# Patient Record
Sex: FEMALE | Race: BLACK OR AFRICAN AMERICAN | Employment: OTHER | URBAN - METROPOLITAN AREA
[De-identification: names, ages, dates, MRNs, and addresses within clinical notes are randomized per-mention and may not be internally consistent; named-entity substitution may affect disease eponyms.]

---

## 2018-03-26 ENCOUNTER — HOSPITAL ENCOUNTER (OUTPATIENT)
Dept: PREADMISSION TESTING | Age: 78
Discharge: HOME | End: 2018-03-26
Attending: ORTHOPAEDIC SURGERY
Payer: COMMERCIAL

## 2018-03-26 LAB
ALBUMIN SERPL-MCNC: 3.4 G/DL (ref 3.4–5)
ALBUMIN/GLOB SERPL: 0.9 {RATIO} (ref 0.8–1.7)
ALP SERPL-CCNC: 77 U/L (ref 45–117)
ALT SERPL-CCNC: 28 U/L (ref 13–56)
ANION GAP SERPL CALC-SCNC: 5 MMOL/L (ref 3–18)
APPEARANCE UR: CLEAR
APTT PPP: 34.1 SEC (ref 23–36.4)
AST SERPL-CCNC: 29 U/L (ref 15–37)
BACTERIA SPEC CULT: NORMAL
BACTERIA URNS QL MICRO: ABNORMAL /HPF
BASOPHILS # BLD: 0 K/UL (ref 0–0.06)
BASOPHILS NFR BLD: 0 % (ref 0–2)
BILIRUB SERPL-MCNC: 0.2 MG/DL (ref 0.2–1)
BILIRUB UR QL: NEGATIVE
BUN SERPL-MCNC: 23 MG/DL (ref 7–18)
BUN/CREAT SERPL: 33 (ref 12–20)
CALCIUM SERPL-MCNC: 9.3 MG/DL (ref 8.5–10.1)
CHLORIDE SERPL-SCNC: 107 MMOL/L (ref 100–108)
CO2 SERPL-SCNC: 30 MMOL/L (ref 21–32)
COLOR UR: YELLOW
CREAT SERPL-MCNC: 0.7 MG/DL (ref 0.6–1.3)
DIFFERENTIAL METHOD BLD: ABNORMAL
EOSINOPHIL # BLD: 0.2 K/UL (ref 0–0.4)
EOSINOPHIL NFR BLD: 2 % (ref 0–5)
EPITH CASTS URNS QL MICRO: ABNORMAL /LPF (ref 0–5)
ERYTHROCYTE [DISTWIDTH] IN BLOOD BY AUTOMATED COUNT: 15.4 % (ref 11.6–14.5)
ERYTHROCYTE [SEDIMENTATION RATE] IN BLOOD: 30 MM/HR (ref 0–30)
EST. AVERAGE GLUCOSE BLD GHB EST-MCNC: 140 MG/DL
GLOBULIN SER CALC-MCNC: 3.7 G/DL (ref 2–4)
GLUCOSE SERPL-MCNC: 97 MG/DL (ref 74–99)
GLUCOSE UR STRIP.AUTO-MCNC: NEGATIVE MG/DL
HBA1C MFR BLD: 6.5 % (ref 4.5–5.6)
HCT VFR BLD AUTO: 35.3 % (ref 35–45)
HGB BLD-MCNC: 11.3 G/DL (ref 12–16)
HGB UR QL STRIP: NEGATIVE
INR PPP: 1.9 (ref 0.8–1.2)
KETONES UR QL STRIP.AUTO: NEGATIVE MG/DL
LEUKOCYTE ESTERASE UR QL STRIP.AUTO: ABNORMAL
LYMPHOCYTES # BLD: 1.5 K/UL (ref 0.9–3.6)
LYMPHOCYTES NFR BLD: 15 % (ref 21–52)
MCH RBC QN AUTO: 26.2 PG (ref 24–34)
MCHC RBC AUTO-ENTMCNC: 32 G/DL (ref 31–37)
MCV RBC AUTO: 81.7 FL (ref 74–97)
MONOCYTES # BLD: 0.3 K/UL (ref 0.05–1.2)
MONOCYTES NFR BLD: 3 % (ref 3–10)
NEUTS SEG # BLD: 8.1 K/UL (ref 1.8–8)
NEUTS SEG NFR BLD: 80 % (ref 40–73)
NITRITE UR QL STRIP.AUTO: NEGATIVE
PH UR STRIP: 5.5 [PH] (ref 5–8)
PLATELET # BLD AUTO: 192 K/UL (ref 135–420)
PMV BLD AUTO: 10.4 FL (ref 9.2–11.8)
POTASSIUM SERPL-SCNC: 4 MMOL/L (ref 3.5–5.5)
PROT SERPL-MCNC: 7.1 G/DL (ref 6.4–8.2)
PROT UR STRIP-MCNC: NEGATIVE MG/DL
PROTHROMBIN TIME: 20.7 SEC (ref 11.5–15.2)
RBC # BLD AUTO: 4.32 M/UL (ref 4.2–5.3)
RBC #/AREA URNS HPF: NEGATIVE /HPF (ref 0–5)
SERVICE CMNT-IMP: NORMAL
SODIUM SERPL-SCNC: 142 MMOL/L (ref 136–145)
SP GR UR REFRACTOMETRY: 1.02 (ref 1–1.03)
UROBILINOGEN UR QL STRIP.AUTO: 0.2 EU/DL (ref 0.2–1)
WBC # BLD AUTO: 10.2 K/UL (ref 4.6–13.2)
WBC URNS QL MICRO: ABNORMAL /HPF (ref 0–5)

## 2018-03-26 PROCEDURE — 87641 MR-STAPH DNA AMP PROBE: CPT | Performed by: ORTHOPAEDIC SURGERY

## 2018-03-26 PROCEDURE — 36415 COLL VENOUS BLD VENIPUNCTURE: CPT | Performed by: ORTHOPAEDIC SURGERY

## 2018-03-26 PROCEDURE — 80053 COMPREHEN METABOLIC PANEL: CPT | Performed by: ORTHOPAEDIC SURGERY

## 2018-03-26 PROCEDURE — 85610 PROTHROMBIN TIME: CPT | Performed by: ORTHOPAEDIC SURGERY

## 2018-03-26 PROCEDURE — 85025 COMPLETE CBC W/AUTO DIFF WBC: CPT | Performed by: ORTHOPAEDIC SURGERY

## 2018-03-26 PROCEDURE — 83036 HEMOGLOBIN GLYCOSYLATED A1C: CPT | Performed by: ORTHOPAEDIC SURGERY

## 2018-03-26 PROCEDURE — 93005 ELECTROCARDIOGRAM TRACING: CPT

## 2018-03-26 PROCEDURE — 85652 RBC SED RATE AUTOMATED: CPT | Performed by: ORTHOPAEDIC SURGERY

## 2018-03-26 PROCEDURE — 85730 THROMBOPLASTIN TIME PARTIAL: CPT | Performed by: ORTHOPAEDIC SURGERY

## 2018-03-26 PROCEDURE — 81001 URINALYSIS AUTO W/SCOPE: CPT | Performed by: ORTHOPAEDIC SURGERY

## 2018-03-28 LAB
ATRIAL RATE: 90 BPM
CALCULATED P AXIS, ECG09: 58 DEGREES
CALCULATED R AXIS, ECG10: 13 DEGREES
CALCULATED T AXIS, ECG11: 50 DEGREES
DIAGNOSIS, 93000: NORMAL
P-R INTERVAL, ECG05: 186 MS
Q-T INTERVAL, ECG07: 378 MS
QRS DURATION, ECG06: 84 MS
QTC CALCULATION (BEZET), ECG08: 462 MS
VENTRICULAR RATE, ECG03: 90 BPM

## 2018-04-21 PROBLEM — M17.11 OSTEOARTHRITIS OF RIGHT KNEE: Chronic | Status: ACTIVE | Noted: 2018-04-21

## 2018-04-21 NOTE — DISCHARGE INSTRUCTIONS
[address] [facility] Medicine   Patient Discharge Instructions    Marco A Leyva / [id number] : 1940    Admitted (Not on file) Discharged: 2018     IF YOU HAVE ANY PROBLEMS ONCE YOU ARE  [facility] FOLLOWING NUMBERS:   Main office number: [phone]    Your follow up appointment to see either Dr. Leila Garrido PA-C, or [facility] PAAMBAR as scheduled in 2 weeks. If you are unsure of your appointment date call the office at [phone]. Medication Instructions     · Resume your home medictions as directed, you may have directed not to resume supplements until after your follow up. · A prescription for pain medication has been given   · It is important that you take the medication exactly as they are prescribed. · Keep your medication in the bottles provided by the pharmacist and keep a list of the medication names, dosages, and times to be taken in your wallet. · Do not take other medications without consulting your doctor. What to do at [address] [place] [address] Ave your prehospital diet. If you have excessive nausea or vomitting call your doctor's office. Be sure to maintain adequate fluid intake. Some pain medications may cause constipation. Remember to drink fluids, stay as active as possible, and eat plenty of fiber-rich foods. Begin In-Home Physical Therapy; 3 times a week to work on gait training, range of motion, strengthening, and weight bearing exercises as tolerable. Continue to use your walker or cane when walking. May progress from the walker to a cane to complete total bearing as tolerable. Patient may shower. Wrap incision with plastic wrap/covering to prevent incision from getting wet. Avoid complete immersion. YOUR DRESSING SHOULD BE CHANGED IN 3-5 DAYS BY [facility] NURSE.       When to Call    - Call if you have a temperature greater then 101  - Unable to keep food down  - Are unable to bear any wieght   - Need a pain medication refill     Information obtained by :  I understand that if any problems occur once I am at home I am to contact my physician. I understand and acknowledge receipt of the instructions indicated above.                                                                                                                                            Physician's or R.N.'s Signature                                                                  Date/Time                                                                                                                                              Patient or Representative Signature                                                          Date/Time

## 2018-04-21 NOTE — H&P
[address] [facility] [address] Medicine  History and Physical Exam    Patient: Tyesha Nascimento MRN: [id number]  SSN: [id number]    YOB: 1940  Age: 66 y.o. Sex: female      Subjective:      Chief Complaint: Right knee pain    History of Present Illness:  Patient complains of pain to the right knee and difficulty ambulating, which has progressively worsened over several months. X-rays showed osteoarthritis of the joint. The patient's pain has persisted and progressed despite conservative treatments and therapies. The patient has been previously treated with nsaids. The patient has at this time opted for surgical intervention. Past Medical History:   Diagnosis Date    Arthritis     all over    Hypertension many years    Ill-defined condition     benign murmur as a child    Ill-defined condition     environmental allergies, ragweed, pollen    Osteoarthritis of right knee 4/21/2018     Past Surgical History:   Procedure Laterality Date    HX APPENDECTOMY      HX HYSTERECTOMY  2015    HX KNEE REPLACEMENT Left 20 teens    HX ORTHOPAEDIC Bilateral 2000's    ctr    HX ORTHOPAEDIC  2000's    trigger thumb    HX TONSILLECTOMY      as a child     Social History     Occupational History    Not on file. Social History Main Topics    Smoking status: Never Smoker    Smokeless tobacco: Never Used    Alcohol use Yes      Comment: 1 a month    Drug use: No    Sexual activity: Not on file     Prior to Admission medications    Medication Sig Start Date End Date Taking? Authorizing Provider   amLODIPine (NORVASC) 5 mg tablet Take 5 mg by mouth daily. Historical Provider   losartan (COZAAR) 100 mg tablet Take 100 mg by mouth daily. Historical Provider   simvastatin (ZOCOR) 20 mg tablet Take 20 mg by mouth nightly. Historical Provider   montelukast (SINGULAIR) 10 mg tablet Take 10 mg by mouth nightly.     Historical Provider   cetirizine (ZYRTEC) 10 mg tablet Take 10 mg by mouth daily. Historical Provider   OTHER Indications: PRO AIR inhaler prn 8.5 g    Historical Provider   ibuprofen (ADVIL) 200 mg tablet Take 400 mg by mouth two (2) times a day. Historical Provider   omega 3-DHA-EPA-fish oil 1,000 mg (120 mg-180 mg) capsule Take 2 Caps by mouth daily. Historical Provider   cholecalciferol, VITAMIN D3, (VITAMIN D3) 5,000 unit tab tablet Take 5,000 Units by mouth daily. Historical Provider   polyvinyl alcohol-povidon,PF, (REFRESH CLASSIC) 1.4-0.6 % ophthalmic solution Administer 1-2 Drops to both eyes as needed. Historical Provider       Allergies: Allergies   Allergen Reactions    Celery Unknown (comments)     Raw Celery    Kiwi Unknown (comments)    Melon Unknown (comments)    Madison Unknown (comments)        Review of Systems:  A comprehensive review of systems was negative except for that written in the History of Present Illness. Objective:       Physical Exam:  HEENT: Normocephalic, atraumatic  Lungs:  Clear to auscultation  Heart:   Regular rate and rhythm  Abdomen: Soft  Extremities:  Pain with range of motion of the right knee(s). Active extension decreased, active flexion decreased. Tenderness generalized. No deformity. No effusion. Positive crepitus. Antalgic gait. Assessment:      Arthritis of the right knee. Plan:       Proceed with scheduled RIGHT TOTAL KNEE ARTHROPLASTY. The various methods of treatment have been discussed with the patient and family. After consideration of risks, benefits, and other options for treatment, the patient has consented to surgical interventions. Questions were answered and preoperative teaching was done by Dr Pedro Cano.      Signed By: Rica Vieyra, PA     April 21, 2018

## 2018-04-23 ENCOUNTER — ANESTHESIA (OUTPATIENT)
Dept: SURGERY | Age: 78
DRG: 470 | End: 2018-04-23
Payer: MEDICARE

## 2018-04-23 ENCOUNTER — APPOINTMENT (OUTPATIENT)
Dept: GENERAL RADIOLOGY | Age: 78
DRG: 470 | End: 2018-04-23
Attending: PHYSICIAN ASSISTANT
Payer: MEDICARE

## 2018-04-23 ENCOUNTER — ANESTHESIA EVENT (OUTPATIENT)
Dept: SURGERY | Age: 78
DRG: 470 | End: 2018-04-23
Payer: MEDICARE

## 2018-04-23 ENCOUNTER — HOSPITAL ENCOUNTER (INPATIENT)
Age: 78
LOS: 2 days | Discharge: NURSING, REHAB OR LONG-TERM CARE | DRG: 470 | End: 2018-04-25
Attending: ORTHOPAEDIC SURGERY | Admitting: ORTHOPAEDIC SURGERY
Payer: MEDICARE

## 2018-04-23 DIAGNOSIS — M17.11 PRIMARY OSTEOARTHRITIS OF RIGHT KNEE: Primary | Chronic | ICD-10-CM

## 2018-04-23 PROBLEM — M17.12 OSTEOARTHRITIS OF LEFT KNEE: Status: ACTIVE | Noted: 2018-04-23

## 2018-04-23 LAB
ABO + RH BLD: NORMAL
BLOOD GROUP ANTIBODIES SERPL: NORMAL
GLUCOSE BLD STRIP.AUTO-MCNC: 85 MG/DL (ref 70–110)
SPECIMEN EXP DATE BLD: NORMAL

## 2018-04-23 PROCEDURE — 74011250636 HC RX REV CODE- 250/636: Performed by: ORTHOPAEDIC SURGERY

## 2018-04-23 PROCEDURE — 74011250636 HC RX REV CODE- 250/636: Performed by: PHYSICIAN ASSISTANT

## 2018-04-23 PROCEDURE — 74011250637 HC RX REV CODE- 250/637: Performed by: SPECIALIST

## 2018-04-23 PROCEDURE — 86900 BLOOD TYPING SEROLOGIC ABO: CPT | Performed by: ORTHOPAEDIC SURGERY

## 2018-04-23 PROCEDURE — 74011000258 HC RX REV CODE- 258: Performed by: ORTHOPAEDIC SURGERY

## 2018-04-23 PROCEDURE — 36415 COLL VENOUS BLD VENIPUNCTURE: CPT | Performed by: ORTHOPAEDIC SURGERY

## 2018-04-23 PROCEDURE — 77030020259 HC SOL INJ SOD CL 0.9% 100ML BG: Performed by: ORTHOPAEDIC SURGERY

## 2018-04-23 PROCEDURE — 77030011628: Performed by: ORTHOPAEDIC SURGERY

## 2018-04-23 PROCEDURE — 76060000033 HC ANESTHESIA 1 TO 1.5 HR: Performed by: ORTHOPAEDIC SURGERY

## 2018-04-23 PROCEDURE — 64450 NJX AA&/STRD OTHER PN/BRANCH: CPT | Performed by: ORTHOPAEDIC SURGERY

## 2018-04-23 PROCEDURE — 76942 ECHO GUIDE FOR BIOPSY: CPT | Performed by: ORTHOPAEDIC SURGERY

## 2018-04-23 PROCEDURE — 73560 X-RAY EXAM OF KNEE 1 OR 2: CPT

## 2018-04-23 PROCEDURE — 77030003666 HC NDL SPINAL BD -A: Performed by: ORTHOPAEDIC SURGERY

## 2018-04-23 PROCEDURE — 77010033678 HC OXYGEN DAILY

## 2018-04-23 PROCEDURE — C1776 JOINT DEVICE (IMPLANTABLE): HCPCS | Performed by: ORTHOPAEDIC SURGERY

## 2018-04-23 PROCEDURE — 74011250637 HC RX REV CODE- 250/637: Performed by: PHYSICIAN ASSISTANT

## 2018-04-23 PROCEDURE — 77030012893: Performed by: ORTHOPAEDIC SURGERY

## 2018-04-23 PROCEDURE — 74011000250 HC RX REV CODE- 250: Performed by: ORTHOPAEDIC SURGERY

## 2018-04-23 PROCEDURE — 77030027138 HC INCENT SPIROMETER -A: Performed by: ORTHOPAEDIC SURGERY

## 2018-04-23 PROCEDURE — 74011250636 HC RX REV CODE- 250/636: Performed by: SPECIALIST

## 2018-04-23 PROCEDURE — 76010000149 HC OR TIME 1 TO 1.5 HR: Performed by: ORTHOPAEDIC SURGERY

## 2018-04-23 PROCEDURE — 77030037876 HC DRSG MEPILEX 16-48IN BORD MOLN -A: Performed by: ORTHOPAEDIC SURGERY

## 2018-04-23 PROCEDURE — 76210000016 HC OR PH I REC 1 TO 1.5 HR: Performed by: ORTHOPAEDIC SURGERY

## 2018-04-23 PROCEDURE — 77030038011: Performed by: ORTHOPAEDIC SURGERY

## 2018-04-23 PROCEDURE — 77030032489 HC SLV COMPR SCD FT CUF COVD -B: Performed by: ORTHOPAEDIC SURGERY

## 2018-04-23 PROCEDURE — 65270000029 HC RM PRIVATE

## 2018-04-23 PROCEDURE — 77030013708 HC HNDPC SUC IRR PULS STRY –B: Performed by: ORTHOPAEDIC SURGERY

## 2018-04-23 PROCEDURE — 97116 GAIT TRAINING THERAPY: CPT

## 2018-04-23 PROCEDURE — 74011250636 HC RX REV CODE- 250/636

## 2018-04-23 PROCEDURE — 74011000250 HC RX REV CODE- 250

## 2018-04-23 PROCEDURE — C9290 INJ, BUPIVACAINE LIPOSOME: HCPCS | Performed by: ORTHOPAEDIC SURGERY

## 2018-04-23 PROCEDURE — 0SRC0J9 REPLACEMENT OF RIGHT KNEE JOINT WITH SYNTHETIC SUBSTITUTE, CEMENTED, OPEN APPROACH: ICD-10-PCS | Performed by: ORTHOPAEDIC SURGERY

## 2018-04-23 PROCEDURE — C1713 ANCHOR/SCREW BN/BN,TIS/BN: HCPCS | Performed by: ORTHOPAEDIC SURGERY

## 2018-04-23 PROCEDURE — 77030031139 HC SUT VCRL2 J&J -A: Performed by: ORTHOPAEDIC SURGERY

## 2018-04-23 PROCEDURE — 82962 GLUCOSE BLOOD TEST: CPT

## 2018-04-23 PROCEDURE — 77030018836 HC SOL IRR NACL ICUM -A: Performed by: ORTHOPAEDIC SURGERY

## 2018-04-23 PROCEDURE — 77030002934 HC SUT MCRYL J&J -B: Performed by: ORTHOPAEDIC SURGERY

## 2018-04-23 PROCEDURE — 77030020782 HC GWN BAIR PAWS FLX 3M -B: Performed by: ORTHOPAEDIC SURGERY

## 2018-04-23 PROCEDURE — 77030038010: Performed by: ORTHOPAEDIC SURGERY

## 2018-04-23 PROCEDURE — 77030011640 HC PAD GRND REM COVD -A: Performed by: ORTHOPAEDIC SURGERY

## 2018-04-23 PROCEDURE — 74011250637 HC RX REV CODE- 250/637: Performed by: ORTHOPAEDIC SURGERY

## 2018-04-23 PROCEDURE — 97161 PT EVAL LOW COMPLEX 20 MIN: CPT

## 2018-04-23 PROCEDURE — 77030012508 HC MSK AIRWY LMA AMBU -A: Performed by: SPECIALIST

## 2018-04-23 PROCEDURE — 77030020813 HC INST SCULP CEM KT DISP S&N -B: Performed by: ORTHOPAEDIC SURGERY

## 2018-04-23 PROCEDURE — 77030034694 HC SCPL CANADY PLSM DISP USMD -E: Performed by: ORTHOPAEDIC SURGERY

## 2018-04-23 PROCEDURE — 77030037400 HC ADH TISS HI VISC EXOFIN CHMP -B: Performed by: ORTHOPAEDIC SURGERY

## 2018-04-23 DEVICE — COMPONENT PAT DIA35MM KNEE POLY DOME CEM MEDIALIZED ATTUNE: Type: IMPLANTABLE DEVICE | Site: KNEE | Status: FUNCTIONAL

## 2018-04-23 DEVICE — CEMENT BNE 20GM HALF DOSE PMMA VISC RADPQ FAST: Type: IMPLANTABLE DEVICE | Site: KNEE | Status: FUNCTIONAL

## 2018-04-23 DEVICE — COMPONENT FEM SZ 5 L KNEE NAR POST STBL CEM ATTUNE: Type: IMPLANTABLE DEVICE | Site: KNEE | Status: FUNCTIONAL

## 2018-04-23 DEVICE — INSERT TIB RP FEM KNEE CEM: Type: IMPLANTABLE DEVICE | Status: FUNCTIONAL

## 2018-04-23 RX ORDER — SODIUM CHLORIDE 0.9 % (FLUSH) 0.9 %
5-10 SYRINGE (ML) INJECTION AS NEEDED
Status: DISCONTINUED | OUTPATIENT
Start: 2018-04-23 | End: 2018-04-23 | Stop reason: HOSPADM

## 2018-04-23 RX ORDER — METOCLOPRAMIDE HYDROCHLORIDE 5 MG/ML
10 INJECTION INTRAMUSCULAR; INTRAVENOUS
Status: DISCONTINUED | OUTPATIENT
Start: 2018-04-23 | End: 2018-04-25 | Stop reason: HOSPADM

## 2018-04-23 RX ORDER — MELATONIN
5000 DAILY
Status: DISCONTINUED | OUTPATIENT
Start: 2018-04-24 | End: 2018-04-25 | Stop reason: HOSPADM

## 2018-04-23 RX ORDER — NALOXONE HYDROCHLORIDE 0.4 MG/ML
0.4 INJECTION, SOLUTION INTRAMUSCULAR; INTRAVENOUS; SUBCUTANEOUS AS NEEDED
Status: DISCONTINUED | OUTPATIENT
Start: 2018-04-23 | End: 2018-04-25 | Stop reason: HOSPADM

## 2018-04-23 RX ORDER — ZOLPIDEM TARTRATE 5 MG/1
5-10 TABLET ORAL
Status: DISCONTINUED | OUTPATIENT
Start: 2018-04-23 | End: 2018-04-25 | Stop reason: HOSPADM

## 2018-04-23 RX ORDER — ACETAMINOPHEN 325 MG/1
650 TABLET ORAL EVERY 6 HOURS
Status: DISCONTINUED | OUTPATIENT
Start: 2018-04-23 | End: 2018-04-25 | Stop reason: HOSPADM

## 2018-04-23 RX ORDER — ONDANSETRON 2 MG/ML
4 INJECTION INTRAMUSCULAR; INTRAVENOUS
Status: DISCONTINUED | OUTPATIENT
Start: 2018-04-23 | End: 2018-04-25 | Stop reason: HOSPADM

## 2018-04-23 RX ORDER — ACETAMINOPHEN 10 MG/ML
1000 INJECTION, SOLUTION INTRAVENOUS ONCE
Status: COMPLETED | OUTPATIENT
Start: 2018-04-23 | End: 2018-04-23

## 2018-04-23 RX ORDER — SODIUM CHLORIDE 0.9 % (FLUSH) 0.9 %
5-10 SYRINGE (ML) INJECTION EVERY 8 HOURS
Status: DISCONTINUED | OUTPATIENT
Start: 2018-04-23 | End: 2018-04-25 | Stop reason: HOSPADM

## 2018-04-23 RX ORDER — ONDANSETRON 2 MG/ML
INJECTION INTRAMUSCULAR; INTRAVENOUS AS NEEDED
Status: DISCONTINUED | OUTPATIENT
Start: 2018-04-23 | End: 2018-04-23 | Stop reason: HOSPADM

## 2018-04-23 RX ORDER — SODIUM CHLORIDE, SODIUM LACTATE, POTASSIUM CHLORIDE, CALCIUM CHLORIDE 600; 310; 30; 20 MG/100ML; MG/100ML; MG/100ML; MG/100ML
125 INJECTION, SOLUTION INTRAVENOUS CONTINUOUS
Status: DISCONTINUED | OUTPATIENT
Start: 2018-04-23 | End: 2018-04-25 | Stop reason: HOSPADM

## 2018-04-23 RX ORDER — SODIUM CHLORIDE 9 MG/ML
300 INJECTION, SOLUTION INTRAVENOUS CONTINUOUS
Status: DISPENSED | OUTPATIENT
Start: 2018-04-23 | End: 2018-04-23

## 2018-04-23 RX ORDER — FENTANYL CITRATE 50 UG/ML
25 INJECTION, SOLUTION INTRAMUSCULAR; INTRAVENOUS
Status: DISCONTINUED | OUTPATIENT
Start: 2018-04-23 | End: 2018-04-23 | Stop reason: HOSPADM

## 2018-04-23 RX ORDER — DEXTROSE 50 % IN WATER (D50W) INTRAVENOUS SYRINGE
25-50 AS NEEDED
Status: DISCONTINUED | OUTPATIENT
Start: 2018-04-23 | End: 2018-04-23 | Stop reason: HOSPADM

## 2018-04-23 RX ORDER — INSULIN LISPRO 100 [IU]/ML
INJECTION, SOLUTION INTRAVENOUS; SUBCUTANEOUS ONCE
Status: DISCONTINUED | OUTPATIENT
Start: 2018-04-23 | End: 2018-04-23 | Stop reason: HOSPADM

## 2018-04-23 RX ORDER — ACETAMINOPHEN 325 MG/1
TABLET ORAL
COMMUNITY
End: 2018-04-25

## 2018-04-23 RX ORDER — OXYCODONE AND ACETAMINOPHEN 5; 325 MG/1; MG/1
TABLET ORAL
Qty: 60 TAB | Refills: 0 | Status: ON HOLD | OUTPATIENT
Start: 2018-04-23 | End: 2021-02-03

## 2018-04-23 RX ORDER — PREGABALIN 50 MG/1
50 CAPSULE ORAL
Status: COMPLETED | OUTPATIENT
Start: 2018-04-23 | End: 2018-04-23

## 2018-04-23 RX ORDER — PANTOPRAZOLE SODIUM 40 MG/1
40 TABLET, DELAYED RELEASE ORAL DAILY
Status: DISCONTINUED | OUTPATIENT
Start: 2018-04-23 | End: 2018-04-25 | Stop reason: HOSPADM

## 2018-04-23 RX ORDER — DIPHENHYDRAMINE HCL 25 MG
25 CAPSULE ORAL
Status: DISCONTINUED | OUTPATIENT
Start: 2018-04-23 | End: 2018-04-25 | Stop reason: HOSPADM

## 2018-04-23 RX ORDER — DEXAMETHASONE SODIUM PHOSPHATE 4 MG/ML
4 INJECTION, SOLUTION INTRA-ARTICULAR; INTRALESIONAL; INTRAMUSCULAR; INTRAVENOUS; SOFT TISSUE ONCE
Status: COMPLETED | OUTPATIENT
Start: 2018-04-23 | End: 2018-04-23

## 2018-04-23 RX ORDER — MONTELUKAST SODIUM 10 MG/1
10 TABLET ORAL
Status: DISCONTINUED | OUTPATIENT
Start: 2018-04-23 | End: 2018-04-25 | Stop reason: HOSPADM

## 2018-04-23 RX ORDER — KETOROLAC TROMETHAMINE 15 MG/ML
15 INJECTION, SOLUTION INTRAMUSCULAR; INTRAVENOUS EVERY 6 HOURS
Status: COMPLETED | OUTPATIENT
Start: 2018-04-23 | End: 2018-04-24

## 2018-04-23 RX ORDER — LANOLIN ALCOHOL/MO/W.PET/CERES
1 CREAM (GRAM) TOPICAL 3 TIMES DAILY
Status: DISCONTINUED | OUTPATIENT
Start: 2018-04-23 | End: 2018-04-25 | Stop reason: HOSPADM

## 2018-04-23 RX ORDER — LORATADINE 10 MG/1
10 TABLET ORAL DAILY
Status: DISCONTINUED | OUTPATIENT
Start: 2018-04-24 | End: 2018-04-25 | Stop reason: HOSPADM

## 2018-04-23 RX ORDER — TRANEXAMIC ACID 650 1/1
1950 TABLET ORAL ONCE
Status: COMPLETED | OUTPATIENT
Start: 2018-04-23 | End: 2018-04-23

## 2018-04-23 RX ORDER — MIDAZOLAM HYDROCHLORIDE 1 MG/ML
INJECTION, SOLUTION INTRAMUSCULAR; INTRAVENOUS AS NEEDED
Status: DISCONTINUED | OUTPATIENT
Start: 2018-04-23 | End: 2018-04-23 | Stop reason: HOSPADM

## 2018-04-23 RX ORDER — MAGNESIUM SULFATE 100 %
4 CRYSTALS MISCELLANEOUS AS NEEDED
Status: DISCONTINUED | OUTPATIENT
Start: 2018-04-23 | End: 2018-04-23 | Stop reason: HOSPADM

## 2018-04-23 RX ORDER — NALOXONE HYDROCHLORIDE 0.4 MG/ML
0.2 INJECTION, SOLUTION INTRAMUSCULAR; INTRAVENOUS; SUBCUTANEOUS AS NEEDED
Status: DISCONTINUED | OUTPATIENT
Start: 2018-04-23 | End: 2018-04-23 | Stop reason: HOSPADM

## 2018-04-23 RX ORDER — PROPOFOL 10 MG/ML
INJECTION, EMULSION INTRAVENOUS AS NEEDED
Status: DISCONTINUED | OUTPATIENT
Start: 2018-04-23 | End: 2018-04-23 | Stop reason: HOSPADM

## 2018-04-23 RX ORDER — PANTOPRAZOLE SODIUM 40 MG/1
40 TABLET, DELAYED RELEASE ORAL DAILY
Status: DISCONTINUED | OUTPATIENT
Start: 2018-04-23 | End: 2018-04-23

## 2018-04-23 RX ORDER — CEFAZOLIN SODIUM/WATER 2 G/20 ML
2 SYRINGE (ML) INTRAVENOUS EVERY 8 HOURS
Status: COMPLETED | OUTPATIENT
Start: 2018-04-23 | End: 2018-04-24

## 2018-04-23 RX ORDER — SODIUM CHLORIDE, SODIUM LACTATE, POTASSIUM CHLORIDE, CALCIUM CHLORIDE 600; 310; 30; 20 MG/100ML; MG/100ML; MG/100ML; MG/100ML
100 INJECTION, SOLUTION INTRAVENOUS CONTINUOUS
Status: DISCONTINUED | OUTPATIENT
Start: 2018-04-23 | End: 2018-04-23 | Stop reason: HOSPADM

## 2018-04-23 RX ORDER — SODIUM CHLORIDE 9 MG/ML
125 INJECTION, SOLUTION INTRAVENOUS CONTINUOUS
Status: DISPENSED | OUTPATIENT
Start: 2018-04-23 | End: 2018-04-24

## 2018-04-23 RX ORDER — LIDOCAINE HYDROCHLORIDE 20 MG/ML
INJECTION, SOLUTION EPIDURAL; INFILTRATION; INTRACAUDAL; PERINEURAL AS NEEDED
Status: DISCONTINUED | OUTPATIENT
Start: 2018-04-23 | End: 2018-04-23 | Stop reason: HOSPADM

## 2018-04-23 RX ORDER — EPHEDRINE SULFATE/0.9% NACL/PF 25 MG/5 ML
SYRINGE (ML) INTRAVENOUS AS NEEDED
Status: DISCONTINUED | OUTPATIENT
Start: 2018-04-23 | End: 2018-04-23 | Stop reason: HOSPADM

## 2018-04-23 RX ORDER — ASPIRIN 325 MG/1
325 TABLET, FILM COATED ORAL
Status: DISCONTINUED | OUTPATIENT
Start: 2018-04-23 | End: 2018-04-25 | Stop reason: HOSPADM

## 2018-04-23 RX ORDER — MELOXICAM 7.5 MG/1
7.5 TABLET ORAL 2 TIMES DAILY
Status: DISCONTINUED | OUTPATIENT
Start: 2018-04-23 | End: 2018-04-25 | Stop reason: HOSPADM

## 2018-04-23 RX ORDER — ACETAMINOPHEN 10 MG/ML
1000 INJECTION, SOLUTION INTRAVENOUS EVERY 6 HOURS
Status: DISCONTINUED | OUTPATIENT
Start: 2018-04-23 | End: 2018-04-23 | Stop reason: ALTCHOICE

## 2018-04-23 RX ORDER — LOSARTAN POTASSIUM 50 MG/1
100 TABLET ORAL DAILY
Status: DISCONTINUED | OUTPATIENT
Start: 2018-04-24 | End: 2018-04-25 | Stop reason: HOSPADM

## 2018-04-23 RX ORDER — DIPHENHYDRAMINE HYDROCHLORIDE 50 MG/ML
12.5 INJECTION, SOLUTION INTRAMUSCULAR; INTRAVENOUS
Status: DISCONTINUED | OUTPATIENT
Start: 2018-04-23 | End: 2018-04-25 | Stop reason: HOSPADM

## 2018-04-23 RX ORDER — ASPIRIN 325 MG
325 TABLET ORAL 2 TIMES DAILY
Qty: 42 TAB | Refills: 0 | Status: SHIPPED | OUTPATIENT
Start: 2018-04-23 | End: 2018-05-14

## 2018-04-23 RX ORDER — SIMVASTATIN 20 MG/1
20 TABLET, FILM COATED ORAL
Status: DISCONTINUED | OUTPATIENT
Start: 2018-04-23 | End: 2018-04-25 | Stop reason: HOSPADM

## 2018-04-23 RX ORDER — CELECOXIB 100 MG/1
400 CAPSULE ORAL
Status: COMPLETED | OUTPATIENT
Start: 2018-04-23 | End: 2018-04-23

## 2018-04-23 RX ORDER — OXYCODONE HYDROCHLORIDE 5 MG/1
5-10 TABLET ORAL
Status: DISCONTINUED | OUTPATIENT
Start: 2018-04-23 | End: 2018-04-25 | Stop reason: HOSPADM

## 2018-04-23 RX ORDER — ALBUTEROL SULFATE 90 UG/1
1 AEROSOL, METERED RESPIRATORY (INHALATION)
Status: DISCONTINUED | OUTPATIENT
Start: 2018-04-23 | End: 2018-04-25 | Stop reason: HOSPADM

## 2018-04-23 RX ORDER — AMLODIPINE BESYLATE 5 MG/1
5 TABLET ORAL DAILY
Status: DISCONTINUED | OUTPATIENT
Start: 2018-04-24 | End: 2018-04-25 | Stop reason: HOSPADM

## 2018-04-23 RX ORDER — DOCUSATE SODIUM 100 MG/1
100 CAPSULE, LIQUID FILLED ORAL 2 TIMES DAILY
Status: DISCONTINUED | OUTPATIENT
Start: 2018-04-23 | End: 2018-04-25 | Stop reason: HOSPADM

## 2018-04-23 RX ORDER — MELOXICAM 7.5 MG/1
7.5 TABLET ORAL 2 TIMES DAILY
Qty: 28 TAB | Refills: 0 | Status: SHIPPED | OUTPATIENT
Start: 2018-04-23 | End: 2018-05-07

## 2018-04-23 RX ORDER — CEFAZOLIN SODIUM/WATER 2 G/20 ML
2 SYRINGE (ML) INTRAVENOUS ONCE
Status: COMPLETED | OUTPATIENT
Start: 2018-04-23 | End: 2018-04-23

## 2018-04-23 RX ORDER — SODIUM CHLORIDE 0.9 % (FLUSH) 0.9 %
5-10 SYRINGE (ML) INJECTION AS NEEDED
Status: DISCONTINUED | OUTPATIENT
Start: 2018-04-23 | End: 2018-04-25 | Stop reason: HOSPADM

## 2018-04-23 RX ADMIN — FERROUS SULFATE TAB 325 MG (65 MG ELEMENTAL FE) 325 MG: 325 (65 FE) TAB at 21:33

## 2018-04-23 RX ADMIN — ACETAMINOPHEN 650 MG: 325 TABLET ORAL at 23:36

## 2018-04-23 RX ADMIN — Medication 5 MG: at 13:10

## 2018-04-23 RX ADMIN — SIMVASTATIN 20 MG: 20 TABLET, FILM COATED ORAL at 21:33

## 2018-04-23 RX ADMIN — SODIUM CHLORIDE 300 ML/HR: 900 INJECTION, SOLUTION INTRAVENOUS at 15:30

## 2018-04-23 RX ADMIN — ACETAMINOPHEN 1000 MG: 10 INJECTION, SOLUTION INTRAVENOUS at 12:31

## 2018-04-23 RX ADMIN — SODIUM CHLORIDE, SODIUM LACTATE, POTASSIUM CHLORIDE, AND CALCIUM CHLORIDE 125 ML/HR: 600; 310; 30; 20 INJECTION, SOLUTION INTRAVENOUS at 11:03

## 2018-04-23 RX ADMIN — Medication 2 G: at 19:11

## 2018-04-23 RX ADMIN — ONDANSETRON 4 MG: 2 INJECTION INTRAMUSCULAR; INTRAVENOUS at 12:51

## 2018-04-23 RX ADMIN — DEXAMETHASONE SODIUM PHOSPHATE 4 MG: 4 INJECTION, SOLUTION INTRAMUSCULAR; INTRAVENOUS at 11:42

## 2018-04-23 RX ADMIN — PREGABALIN 50 MG: 50 CAPSULE ORAL at 11:42

## 2018-04-23 RX ADMIN — OXYCODONE HYDROCHLORIDE 10 MG: 5 TABLET ORAL at 14:29

## 2018-04-23 RX ADMIN — ASPIRIN 325 MG: 325 TABLET, FILM COATED ORAL at 17:42

## 2018-04-23 RX ADMIN — LIDOCAINE HYDROCHLORIDE 50 MG: 20 INJECTION, SOLUTION EPIDURAL; INFILTRATION; INTRACAUDAL; PERINEURAL at 12:24

## 2018-04-23 RX ADMIN — PROPOFOL 30 MG: 10 INJECTION, EMULSION INTRAVENOUS at 12:45

## 2018-04-23 RX ADMIN — PANTOPRAZOLE SODIUM 40 MG: 40 TABLET, DELAYED RELEASE ORAL at 11:41

## 2018-04-23 RX ADMIN — OXYCODONE HYDROCHLORIDE 10 MG: 5 TABLET ORAL at 23:36

## 2018-04-23 RX ADMIN — OXYCODONE HYDROCHLORIDE 10 MG: 5 TABLET ORAL at 19:11

## 2018-04-23 RX ADMIN — Medication 10 MG: at 13:14

## 2018-04-23 RX ADMIN — MELOXICAM 7.5 MG: 7.5 TABLET ORAL at 17:42

## 2018-04-23 RX ADMIN — FENTANYL CITRATE 25 MCG: 50 INJECTION, SOLUTION INTRAMUSCULAR; INTRAVENOUS at 14:02

## 2018-04-23 RX ADMIN — KETOROLAC TROMETHAMINE 15 MG: 15 INJECTION, SOLUTION INTRAMUSCULAR; INTRAVENOUS at 17:42

## 2018-04-23 RX ADMIN — FERROUS SULFATE TAB 325 MG (65 MG ELEMENTAL FE) 325 MG: 325 (65 FE) TAB at 17:42

## 2018-04-23 RX ADMIN — TRANEXAMIC ACID 1950 MG: 650 TABLET ORAL at 11:03

## 2018-04-23 RX ADMIN — SODIUM CHLORIDE, SODIUM LACTATE, POTASSIUM CHLORIDE, AND CALCIUM CHLORIDE 1000 ML: 600; 310; 30; 20 INJECTION, SOLUTION INTRAVENOUS at 11:03

## 2018-04-23 RX ADMIN — SODIUM CHLORIDE 125 ML/HR: 900 INJECTION, SOLUTION INTRAVENOUS at 21:05

## 2018-04-23 RX ADMIN — DOCUSATE SODIUM 100 MG: 100 CAPSULE, LIQUID FILLED ORAL at 17:42

## 2018-04-23 RX ADMIN — PROPOFOL 60 MG: 10 INJECTION, EMULSION INTRAVENOUS at 12:25

## 2018-04-23 RX ADMIN — Medication 2 G: at 12:23

## 2018-04-23 RX ADMIN — FENTANYL CITRATE 25 MCG: 50 INJECTION, SOLUTION INTRAMUSCULAR; INTRAVENOUS at 14:07

## 2018-04-23 RX ADMIN — CELECOXIB 400 MG: 100 CAPSULE ORAL at 11:41

## 2018-04-23 RX ADMIN — PROPOFOL 110 MG: 10 INJECTION, EMULSION INTRAVENOUS at 12:24

## 2018-04-23 RX ADMIN — MIDAZOLAM HYDROCHLORIDE 4 MG: 1 INJECTION, SOLUTION INTRAMUSCULAR; INTRAVENOUS at 11:46

## 2018-04-23 RX ADMIN — ACETAMINOPHEN 650 MG: 325 TABLET ORAL at 17:42

## 2018-04-23 RX ADMIN — MONTELUKAST SODIUM 10 MG: 10 TABLET, FILM COATED ORAL at 21:33

## 2018-04-23 RX ADMIN — Medication 10 MG: at 13:30

## 2018-04-23 RX ADMIN — SODIUM CHLORIDE 125 ML/HR: 900 INJECTION, SOLUTION INTRAVENOUS at 19:12

## 2018-04-23 RX ADMIN — SODIUM CHLORIDE, SODIUM LACTATE, POTASSIUM CHLORIDE, AND CALCIUM CHLORIDE: 600; 310; 30; 20 INJECTION, SOLUTION INTRAVENOUS at 13:27

## 2018-04-23 RX ADMIN — KETOROLAC TROMETHAMINE 15 MG: 15 INJECTION, SOLUTION INTRAMUSCULAR; INTRAVENOUS at 23:37

## 2018-04-23 NOTE — ANESTHESIA POSTPROCEDURE EVALUATION
.  Post-Anesthesia Evaluation & Assessment    Visit Vitals    /62    Pulse 91    Temp 36.6 °C (97.9 °F)    Resp 18    Ht 5' 5\" (1.651 m)    Wt 90.4 kg (199 lb 5 oz)    SpO2 100%    BMI 33.17 kg/m2       Nausea/Vomiting: no nausea    Post-operative hydration adequate.     Pain score (VAS): 0    Mental status & Level of consciousness: alert and oriented x 3    Neurological status: moves all extremities, sensation grossly intact    Pulmonary status: airway patent, no supplemental oxygen required    Complications related to anesthesia: none    Additional comments:

## 2018-04-23 NOTE — PROGRESS NOTES
Problem: Mobility Impaired (Adult and Pediatric)  Goal: *Acute Goals and Plan of Care (Insert Text)  In 1-7 days pt will be able to perform:  ST.  Bed mobility:  Rolling L to R to L modified independent for positioning. 2.  Supine to sit to supine S with HR for meals. 3.  Sit to stand to sit S with RW in prep for ambulation. LT.  Gait:  Ambulate >150ft S with RW, WBAT, for home/community mobility. 2.  Activity tolerance: Tolerate up in chair 1-2 hours for ADLs. 3.  Patient/Family Education:  Patient/family to be independent with HEP for follow-up care and safe discharge. physical Therapy EVALUATION    Patient: Frantz Ashraf (32 y.o. female)  Date: 2018  Primary Diagnosis: RIGHT KNEE OSTEOARTHRITIS  Osteoarthritis of left knee  Procedure(s) (LRB):  RIGHT TOTAL KNEE REPLACEMENT (Right) Day of Surgery   Precautions:   Fall, WBAT    ASSESSMENT :  Based on the objective data described below, the patient presents with decreased functional mobility and independence in regard to bed mobility, transfers, gt quality and tolerance, R knee AROM, R knee strength, pain, stair negotiation and safety due to recent R TKA surgery. Pt rating pain in R knee 7/10 on numerical pain scale. Pt required CGA/min A for supine to sit to stand. Pt able to participate in gt training w/ RW, WBAT, GB and CGA/min A in hallway w/ antalgic pattern. Pt able to void on commode and perform own hygiene. Pt returned to sitting EOB w/ all needs within reach. Nurse Sheeba  aware and family present. Recommend [facility] upon hospital d/c. Patient will benefit from skilled intervention to address the above impairments.   Patients rehabilitation potential is considered to be Good  Factors which may influence rehabilitation potential include:   []         None noted  []         Mental ability/status  []         Medical condition  []         Home/family situation and support systems  []         Safety awareness  [x]         Pain tolerance/management  []         Other:      PLAN :  Recommendations and Planned Interventions:  [x]           Bed Mobility Training             []    Neuromuscular Re-Education  [x]           Transfer Training                   []    Orthotic/Prosthetic Training  [x]           Gait Training                          []    Modalities  [x]           Therapeutic Exercises          []    Edema Management/Control  [x]           Therapeutic Activities            [x]    Patient and Family Training/Education  []           Other (comment):    Frequency/Duration: Patient will be followed by physical therapy twice daily to address goals. Discharge Recommendations: Home Health  Further Equipment Recommendations for Discharge: N/A     SUBJECTIVE:   Patient stated I feel like I did before the surgery.     OBJECTIVE DATA SUMMARY:     Past Medical History:   Diagnosis Date    Arthritis     all over    Hypertension many years    Ill-defined condition     benign murmur as a child    Ill-defined condition     environmental allergies, ragweed, pollen    Osteoarthritis of right knee 4/21/2018     Past Surgical History:   Procedure Laterality Date    HX APPENDECTOMY      HX HYSTERECTOMY  2015    HX KNEE REPLACEMENT Left 20 teens    HX ORTHOPAEDIC Bilateral 2000's    ctr    HX ORTHOPAEDIC  2000's    trigger thumb    HX TONSILLECTOMY      as a child     Barriers to Learning/Limitations: None  Compensate with: visual, verbal, tactile, kinesthetic cues/model  Prior Level of Function/Home Situation:   Home Situation  Home Environment: Private residence  # Steps to Enter: 2  Rails to Enter: No  One/Two Story Residence: One story  Living Alone: Yes  Support Systems: Family member(s)  Patient Expects to be Discharged to[de-identified] Rehabilitation facility  Current DME Used/Available at Home: Cane, straight, Raised toilet seat, Walker, rolling  Critical Behavior:  Neurologic State: Alert; Appropriate for age  Orientation Level: Oriented X4  Cognition: Appropriate decision making; Appropriate for age attention/concentration; Appropriate safety awareness; Follows commands  Safety/Judgement: Awareness of environment  Psychosocial  Patient Behaviors: Calm; Cooperative  Purposeful Interaction: Yes  Caritas Process: Establish trust;Nurture loving kindness  Skin Condition/Temp: Dry;Warm  Skin Integrity: Incision (comment) (L knee)  Skin Integumentary  Skin Color: Appropriate for ethnicity  Skin Condition/Temp: Dry;Warm  Skin Integrity: Incision (comment) (L knee)  Turgor: Non-tenting  Hair Growth: Present  Varicosities: Absent  Strength:    Strength: Generally decreased, functional  Tone & Sensation:   Tone: Normal  Sensation: Intact  Range Of Motion:  AROM: Generally decreased, functional  Functional Mobility:  Bed Mobility:  Supine to Sit: Contact guard assistance (vc)  Scooting: Contact guard assistance (vc)  Transfers:  Sit to Stand: Minimum assistance; Additional time (vc)  Stand to Sit: Contact guard assistance (vc)  Balance:   Sitting: Intact  Standing: Intact; With support  Ambulation/Gait Training:  Distance (ft): 40 Feet (ft)  Assistive Device: Walker, rolling;Gait belt  Ambulation - Level of Assistance: Contact guard assistance;Minimal assistance (vc)  Gait Abnormalities: Antalgic;Decreased step clearance; Step to gait  Right Side Weight Bearing: As tolerated  Base of Support: Shift to left  Stance: Right decreased  Speed/Louise: Slow  Step Length: Left shortened;Right shortened  Swing Pattern: Left asymmetrical;Right asymmetrical  Interventions: Safety awareness training; Tactile cues; Verbal cues  Therapeutic Exercises:   HEP written copy issued to pt per MD protocol.   Pain:  Pain Scale 1: Numeric (0 - 10)  Pain Intensity 1: 7  Pain Location 1: Knee  Pain Orientation 1: Right  Pain Description 1: Pressure  Pain Intervention(s) 1: Medication (see MAR)  Activity Tolerance:   Fair   Please refer to the flowsheet for vital signs taken during this treatment. After treatment:   [x]         Patient left in no apparent distress sitting up EOB  []         Patient left in no apparent distress in bed  [x]         Call bell left within reach  [x]         Nursing notified  [x]         Family present  []         Bed alarm activated    COMMUNICATION/EDUCATION:   [x]         Fall prevention education was provided and the patient/caregiver indicated understanding. [x]         Patient/family have participated as able in goal setting and plan of care. [x]         Patient/family agree to work toward stated goals and plan of care. []         Patient understands intent and goals of therapy, but is neutral about his/her participation. []         Patient is unable to participate in goal setting and plan of care. Thank you for this referral.  Emmanuel Son, PT   Time Calculation: 28 mins    Eval Complexity: History: MEDIUM  Complexity : 1-2 comorbidities / personal factors will impact the outcome/ POC Exam:MEDIUM Complexity : 3 Standardized tests and measures addressing body structure, function, activity limitation and / or participation in recreation  Presentation: MEDIUM Complexity : Evolving with changing characteristics  Clinical Decision Making:Low Complexity amb <30' Overall Complexity:LOW      Mobility  Current  CJ= 20-39%   Goal  CI= 1-19%. The severity rating is based on the Level of Assistance required for Functional Mobility and ADLs.

## 2018-04-23 NOTE — PERIOP NOTES
Dual skin assessment completed, WNL other the healed scar mid upper back Blood pressure 125/74, pulse 74, temperature 97.2 °F (36.2 °C), resp. rate 12, height 5' 5\" (1.651 m), weight 90.4 kg (199 lb 5 oz), SpO2 100 %.   Patient accepted by Nnamdi Howell

## 2018-04-23 NOTE — INTERVAL H&P NOTE
H&P Update:  JEREMIAH Peck was seen and examined. History and physical has been reviewed. The patient has been examined.  There have been no significant clinical changes since the completion of the originally dated History and Physical.    Signed By: Jammie Emmanuel MD     April 23, 2018 10:09 AM

## 2018-04-23 NOTE — OP NOTES
[address] [address] Medicine  Total Knee Arthroplasty    Patient: Krystle Chan MRN: [id number]  SSN: [id number]    YOB: 1940  Age: 66 y.o. Sex: female      Date of Surgery: 4/23/2018   Preoperative Diagnosis: RIGHT KNEE OSTEOARTHRITIS   Postoperative Diagnosis: RIGHT KNEE OSTEOARTHRITIS   Location: [facility]  Surgeon: Haroldo Saini MD  Assistant: [facility], VALERIO    Anesthesia: General and Femoral Nerve Block    Procedure: Total Knee Arthroplasty: Depuy   The complexity of the total joint surgery requires the use of a first assistant for positioning, retraction and assistance in closure. Tourniquet Time: Tourniquet not used. Estimated Blood Loss: Less than 100cc     Implants:   Implant Name Type Inv. Item Serial No.  Lot No. LRB No. Used Action   CEMENT BNE FAST SET 20GM --  - [id number]  CEMENT BNE FAST SET 20GM --   [employer] [id number] Right 1 Implanted   FEM PS MORRO LUIS MANUEL LT SZ 5 -- ATTUNE - [id number]  FEM PS MORRO LUIS MANUEL LT SZ 5 -- ATTUNE  J [employer] [id number] Right 1 Implanted   ATTUNE TIBIAL INSERT    [employer] [id number] Right 1 Implanted   ATTUNE TIBIAL BASE    [employer] [id number] Right 1 Implanted   PAT LUIS MANUEL DOME MEDIAL 35MM -- ATTUNE - [id number]   PAT LUIS MANUEL DOME MEDIAL 35MM -- ATTUNE   [employer] [id number] Right 1 Implanted        Specimens: None    Additional Findings: None     Body Mass Index: Body mass index is 33.17 kg/(m^2). Procedure Detail:  Prior to the surgery the patient was administered a femoral nerve block in the preoperative holding area by the anesthesiologist. Krystle Chan was brought to the operating room and positioned on the operating table. She was anesthetized with anesthesia. Intravenous antibiotics were administered. Prior to the incision being made a timeout was called identifying the patient, procedure, operative side, and surgeon.  A pneumatic tourniquet was placed about the limb and the right leg was prepped and draped in the usual sterile manner. The tourniquet was not inflated throughout the case. A midline anterior incision made over the knee. The incision was carried down through the subcutaneous tissue to the underlying capsule. A medial parapatellar capsular incision was performed. The medial capsular flap was carefully elevated around to the posterior medial corner protecting the medial collateral ligaments and the fibers. The patella was sized with a caliper, and approximately 10-12 mm was resected with an oscillating saw allowing the patella to be slid into the lateral gutter. It was not everted throughout the case. Our attention was first turned to the distal femur and using intermedullary instrumentation, a 5-degree valgus cut was on the distal end of the femur. The distal end of the femur was sized to a size 5N femoral component. Pins were inserted through the sizer and the corresponding 4-in-1 block was slid into place and pinned for stability. Anterior and posterior and chamfer cuts were made to accommodate the femoral component. The medial and lateral menisci were excised as were the anterior cruciate ligaments. Our attention was then turned to the tibia. Using extramedullary instrumentation, a 3-degree cut was made on the proximal end of the tibia. A spacer block was placed to show gaps had been balanced and a size 5  tibial base plate was placed on the tibia and pinned into place. Intramedullary reaming guide was placed on the tibia and the appropriate reamer was used followed by the keel punch to complete the preparation of the tibia. A trial femoral component was then impacted on to the distal end of the femur. Trial reduction was then performed with incremental size trial bearing surfaces.  The Attune RP CR 8mm bearing surface matching the femur was inserted and allowed for full extension, good medial, lateral stability at 90 degrees of flexion, especially medially. Our attention was then turned to the patella. The patella was sized to a 35mm oval DePuy patella. The guide was pinned, placed over patella, 3 holes were drilled. Trial patella button was inserted and the patella was reduced in the knee. The patella tracked normally using no-touch technique. The trial components were then all removed. The real components were opened on the back. The cut surfaces of the bone were prepared using the PulsaVac lavage. Prior to this, the Aquamantys was used to cauterize any soft tissue bleeding. 20mg of Depuy #2 cement was mixed. The femoral and tibial components were impacted in place and patellar component was cemented into place. Excess cement was removed from around the edge of bone using plastic curette. Once the cement had hardened, the knee was placed through range of motion and noted to be stable as mentioned above with the trail components. The wound was dry, therefore no drain was used. The operative knee was injected with 20 cc of exparel. The knee was then soaked with a diluted betadine solution for approximately 3 min. This was then thoroughly irrigated. The capsular layer was closed using a #2 stratafix suture with the knee flexed 90 degrees, while subcutaneous layers were closed using 2-0 Vicryl suture. Finally the skin was closed using Prineo, which were applied in occlusive fashion and sterile bandage applied. An Iceman cryotherapy pad was applied on the operative leg. Sponge count and needle counts were correct. She was taken to the recovery room extubated in stable condition.     Signed By: Yosef Sorensen MD     April 23, 2018

## 2018-04-23 NOTE — ROUTINE PROCESS
1915  Bedside and Verbal shift change report given to Meryle Grimes RN (oncoming nurse) by Liz Ibrahim RN (offgoing nurse). Report included the following information SBAR, Kardex, MAR and Recent Results.

## 2018-04-23 NOTE — PROGRESS NOTES
Problem: Falls - Risk of  Goal: *Absence of Falls  Document Prasad Fall Risk and appropriate interventions in the flowsheet.    Outcome: Progressing Towards Goal  Fall Risk Interventions:  Mobility Interventions: Utilize walker, cane, or other assitive device, PT Consult for assist device competence, PT Consult for mobility concerns, Patient to call before getting OOB, Communicate number of staff needed for ambulation/transfer         Medication Interventions: Teach patient to arise slowly, Patient to call before getting OOB    Elimination Interventions: Call light in reach, Patient to call for help with toileting needs

## 2018-04-23 NOTE — ANESTHESIA PROCEDURE NOTES
Peripheral Block    Start time: 4/23/2018 11:46 AM  End time: 4/23/2018 11:50 AM  Performed by: Giselle Avalos by: Brandy Mock       Pre-procedure: Indications: at surgeon's request, post-op pain management and procedure for pain    Preanesthetic Checklist: patient identified, risks and benefits discussed, site marked, timeout performed, anesthesia consent given and patient being monitored    Timeout Time: 11:46          Block Type:   Block Type:   Adductor canal  Laterality:  Right  Monitoring:  Standard ASA monitoring, continuous pulse ox, frequent vital sign checks, heart rate, responsive to questions and oxygen  Injection Technique:  Single shot  Procedures: ultrasound guided    Patient Position: supine  Prep: chlorhexidine    Location:  Mid thigh  Needle Type:  Stimuplex  Needle Gauge:  21 G  Needle Localization:  Ultrasound guidance  Medication Injected:  0.5%  ropivacaine  Volume (mL):  30    Assessment:  Number of attempts:  1  Injection Assessment:  Incremental injection every 5 mL, local visualized surrounding nerve on ultrasound, negative aspiration for blood, no intravascular symptoms, no paresthesia and ultrasound image on chart  Patient tolerance:  Patient tolerated the procedure well with no immediate complications

## 2018-04-23 NOTE — PROGRESS NOTES
1524  Assumed care of pt at this time. Assessment complete. Pt alert and oriented x 4. Denies SOB and chest pain. Pt lungs clear bilaterally. Cap refill  less than 3 seconds. Pt denies numbness and tingling to all extremities. Stated pain 5/10. Pt has 18 G IV to R forearm. Pt has mepilex silver dressing to right knee CDI. Plexis and TEDS in place bilaterally. Pt encouraged to continue use of IS. Pt verbalized understanding. Ice pack applied. Call light and possessions within reach. Bed in low position. Will continue to monitor. Dual skin assessment completed with TRINITY Mccollum. RN pt has scar to upper back from prior surgery. Skin intact no open areas    Shift summary   Pt is alert and oriented x 4. Pt had uneventful shift. Pt ambulating and voiding sufficient amounts. Pain controlled by PRN medication.

## 2018-04-23 NOTE — ROUTINE PROCESS
TRANSFER - IN REPORT:    Verbal report received from TRINITY Mccollum RN(name) on [address]  being received from PACU(unit) for routine post - op      Report consisted of patients Situation, Background, Assessment and   Recommendations(SBAR). Information from the following report(s) SBAR, Kardex, [facility] and Recent Results was reviewed with the receiving nurse. Opportunity for questions and clarification was provided. Assessment completed upon patients arrival to unit and care assumed.

## 2018-04-23 NOTE — ANESTHESIA PREPROCEDURE EVALUATION
Anesthetic History   No history of anesthetic complications            Review of Systems / Medical History  Patient summary reviewed, nursing notes reviewed and pertinent labs reviewed    Pulmonary  Within defined limits                 Neuro/Psych   Within defined limits           Cardiovascular    Hypertension: well controlled                   GI/Hepatic/Renal  Within defined limits              Endo/Other        Arthritis     Other Findings              Physical Exam    Airway  Mallampati: III  TM Distance: 4 - 6 cm  Neck ROM: normal range of motion   Mouth opening: Normal     Cardiovascular    Rhythm: regular  Rate: normal         Dental    Dentition: Caps/crowns     Pulmonary  Breath sounds clear to auscultation               Abdominal  GI exam deferred       Other Findings            Anesthetic Plan    ASA: 2  Anesthesia type: general      Post-op pain plan if not by surgeon: peripheral nerve block single    Induction: Intravenous  Anesthetic plan and risks discussed with: Family and Patient      R/B discussed including nerve injury.

## 2018-04-23 NOTE — DISCHARGE SUMMARY
[address] [address]     DISCHARGE SUMMARY     PATIENT: Lj Lebron     MRN: [id number]   ADMIT DATE: 2018   BILLIN   DISCHARGE DATE:      ATTENDING: Miguel Valentine MD   DICTATING: SULY Huang         ADMISSION DIAGNOSIS: RIGHT KNEE OSTEOARTHRITIS  Osteoarthritis of left knee    DISCHARGE DIAGNOSIS: Status post RIGHT TOTAL KNEE ARTHROPLASTY    HISTORY OF PRESENT ILLNESS: The patient is a 66y.o. year-old female   with ongoing right knee pain secondary to osteoarthritis of her right knee. The patient's pain has persisted and progressed despite conservative treatments and therapies. The patient has at this time opted for surgical intervention. PAST MEDICAL HISTORY:   Past Medical History:   Diagnosis Date    Arthritis     all over    Hypertension many years    Ill-defined condition     benign murmur as a child    Ill-defined condition     environmental allergies, ragweed, pollen    Osteoarthritis of right knee 2018       PAST SURGICAL HISTORY:   Past Surgical History:   Procedure Laterality Date    HX APPENDECTOMY      HX HYSTERECTOMY  2015    HX KNEE REPLACEMENT Left 21 teens    HX ORTHOPAEDIC Bilateral 's    ctr    HX ORTHOPAEDIC  's    trigger thumb    HX TONSILLECTOMY      as a child       ALLERGIES:   Allergies   Allergen Reactions    Celery Nausea and Vomiting and Unknown (comments)     Raw Celery - mouth tingle    Kiwi Unknown (comments)      mouth tingle    Lactose Diarrhea    Melon Unknown (comments)      mouth tingle    Strawberry Unknown (comments)      mouth tingle        CURRENT MEDICATIONS:  A list of medications prior to the time of admission include:  Prior to Admission medications    Medication Sig Start Date End Date Taking? Authorizing Provider   acetaminophen (TYLENOL) 325 mg tablet Take  by mouth every four (4) hours as needed for Pain.    Yes Historical Provider L.acid/L.casei/B.bif/B.kyle/FOS (PROBIOTIC BLEND PO) Take  by mouth daily. Yes Historical Provider   aspirin (ASPIRIN) 325 mg tablet Take 1 Tab by mouth two (2) times a day for 21 days. 4/23/18 5/14/18 Yes SULY Corbin   meloxicam (MOBIC) 7.5 mg tablet Take 1 Tab by mouth two (2) times a day for 14 days. 4/23/18 5/7/18 Yes Rica Maza3 PA   oxyCODONE-acetaminophen (PERCOCET) 5-325 mg per tablet Take 1 to 2 tab PO q 4-6 hrs prn pain 4/23/18  Yes SULY Corbin   amLODIPine (NORVASC) 5 mg tablet Take 5 mg by mouth daily. Yes Historical Provider   losartan (COZAAR) 100 mg tablet Take 100 mg by mouth daily. Yes Historical Provider   simvastatin (ZOCOR) 20 mg tablet Take 20 mg by mouth nightly. Yes Historical Provider   montelukast (SINGULAIR) 10 mg tablet Take 10 mg by mouth nightly. Yes Historical Provider   cetirizine (ZYRTEC) 10 mg tablet Take 10 mg by mouth daily. Yes Historical Provider   OTHER Indications: PRO AIR inhaler prn 8.5 g   Yes Historical Provider   ibuprofen (ADVIL) 200 mg tablet Take 400 mg by mouth two (2) times a day. Yes Historical Provider   omega 3-DHA-EPA-fish oil 1,000 mg (120 mg-180 mg) capsule Take 2 Caps by mouth daily. Yes Historical Provider   cholecalciferol, VITAMIN D3, (VITAMIN D3) 5,000 unit tab tablet Take 5,000 Units by mouth daily. Yes Historical Provider   polyvinyl alcohol-povidon,PF, (REFRESH CLASSIC) 1.4-0.6 % ophthalmic solution Administer 1-2 Drops to both eyes as needed. Yes Historical Provider       FAMILY HISTORY: History reviewed. No pertinent family history.     SOCIAL HISTORY:   Social History     Social History    Marital status: SINGLE     Spouse name: N/A    Number of children: N/A    Years of education: N/A     Social History Main Topics    Smoking status: Never Smoker    Smokeless tobacco: Never Used    Alcohol use Yes      Comment: 1 a month    Drug use: No    Sexual activity: Not Asked     Other Topics Concern    None Social History Narrative       REVIEW OF SYSTEMS: All review of systems are negative. PHYSICAL EXAMINATION: For a detailed physical exam, please refer to the patient's chart. HOSPITAL COURSE: The patient was taken to surgery the day of admission. she underwent a right total knee replacement. Operative course was benign. Estimated blood loss was approximately 150 cc. The patient was taken to the PACU in stable condition and was later taken to the floor in stable condition. During her hospital stay, the patient progressed well with physical therapy and occupational therapy, adherent to instructions. she had been cleared by physical therapy with stair training. she was placed on Aspirin for DVT prophylaxis. her pain has been well controlled with oral pain medications. her vitals have remained stable. she has also remained hemodynamically stable. The patient has been recommended for discharge to SNF. DISCHARGE INSTRUCTIONS: The patient is to be discharged to SNF. she is to continue on her prior medications per the medication reconciliation form, to which we will add:   1. Aspirin 325 mg; 1 tablet po bid x 21 days  2. Mobic 7.5 mg; 1 tablet po bid x 14 days  3. Percocet 5/325 mg; 1-2 tablets p.o. every 4 to 6 hours p.r.n. for pain    The patient is to continue at SNF with physical therapy per rehab protocol to work on gait training, range of motion, strengthening, and weightbearing exercises as tolerated on her right lower extremity. The patient is to progress from a walker to a cane to complete total weightbearing as tolerable. The patient is to continue to keep her incision dry. The patient is to followup with Dr. Aruna Estevez, Reji May PA-C and/or Obie Guillory PA-C in the office approximately 10-14 days status post for x-rays and further evaluation.     Rica Ramachandran [address] [place] [address]  04/25/18  7:10 AM

## 2018-04-23 NOTE — PROGRESS NOTES
Admitting as inpatient because patient will need SNF placement for recovery due to not having sufficient home support.     Rica Ramachandran [address], [place]  04/24/18  7:18 AM

## 2018-04-23 NOTE — PERIOP NOTES
Pt. Used restroom in pre-op area with assistance. Patient placed on Fariba Paws for a minimum of 30 min in  Preop.

## 2018-04-23 NOTE — PERIOP NOTES
TRANSFER - OUT REPORT:    Verbal report given to HERACLIO Silva(name) on JEREMIAH Garza  being transferred to [facility](unit) for routine progression of care       Report consisted of patients Situation, Background, Assessment and   Recommendations(SBAR). Information from the following report(s) Procedure Summary, Intake/Output and MAR was reviewed with the receiving nurse. Lines:   Peripheral IV 04/23/18 Right Forearm (Active)   Site Assessment Clean, dry, & intact 4/23/2018  2:00 PM   Phlebitis Assessment 0 4/23/2018  2:00 PM   Infiltration Assessment 0 4/23/2018  2:00 PM   Dressing Status Clean, dry, & intact 4/23/2018  2:00 PM   Dressing Type Tape 4/23/2018  2:00 PM   Hub Color/Line Status Infusing 4/23/2018  2:00 PM        Opportunity for questions and clarification was provided.       Patient transported with:  Also reviewed history as recorded in EMR, as well as pain control

## 2018-04-24 ENCOUNTER — APPOINTMENT (OUTPATIENT)
Dept: GENERAL RADIOLOGY | Age: 78
DRG: 470 | End: 2018-04-24
Attending: ORTHOPAEDIC SURGERY
Payer: MEDICARE

## 2018-04-24 LAB
ANION GAP SERPL CALC-SCNC: 8 MMOL/L (ref 3–18)
BUN SERPL-MCNC: 18 MG/DL (ref 7–18)
BUN/CREAT SERPL: 21 (ref 12–20)
CALCIUM SERPL-MCNC: 9.3 MG/DL (ref 8.5–10.1)
CHLORIDE SERPL-SCNC: 106 MMOL/L (ref 100–108)
CO2 SERPL-SCNC: 26 MMOL/L (ref 21–32)
CREAT SERPL-MCNC: 0.86 MG/DL (ref 0.6–1.3)
ERYTHROCYTE [DISTWIDTH] IN BLOOD BY AUTOMATED COUNT: 15.3 % (ref 11.6–14.5)
GLUCOSE SERPL-MCNC: 134 MG/DL (ref 74–99)
HCT VFR BLD AUTO: 30 % (ref 35–45)
HGB BLD-MCNC: 9.6 G/DL (ref 12–16)
MCH RBC QN AUTO: 25.9 PG (ref 24–34)
MCHC RBC AUTO-ENTMCNC: 32 G/DL (ref 31–37)
MCV RBC AUTO: 81.1 FL (ref 74–97)
PLATELET # BLD AUTO: 205 K/UL (ref 135–420)
PMV BLD AUTO: 11.2 FL (ref 9.2–11.8)
POTASSIUM SERPL-SCNC: 3.9 MMOL/L (ref 3.5–5.5)
RBC # BLD AUTO: 3.7 M/UL (ref 4.2–5.3)
SODIUM SERPL-SCNC: 140 MMOL/L (ref 136–145)
WBC # BLD AUTO: 9.9 K/UL (ref 4.6–13.2)

## 2018-04-24 PROCEDURE — 74011250637 HC RX REV CODE- 250/637: Performed by: PHYSICIAN ASSISTANT

## 2018-04-24 PROCEDURE — 85027 COMPLETE CBC AUTOMATED: CPT | Performed by: PHYSICIAN ASSISTANT

## 2018-04-24 PROCEDURE — 65270000029 HC RM PRIVATE

## 2018-04-24 PROCEDURE — 80048 BASIC METABOLIC PNL TOTAL CA: CPT | Performed by: PHYSICIAN ASSISTANT

## 2018-04-24 PROCEDURE — 97165 OT EVAL LOW COMPLEX 30 MIN: CPT

## 2018-04-24 PROCEDURE — 36415 COLL VENOUS BLD VENIPUNCTURE: CPT | Performed by: PHYSICIAN ASSISTANT

## 2018-04-24 PROCEDURE — 97116 GAIT TRAINING THERAPY: CPT

## 2018-04-24 PROCEDURE — 97530 THERAPEUTIC ACTIVITIES: CPT

## 2018-04-24 PROCEDURE — 74011250637 HC RX REV CODE- 250/637: Performed by: ORTHOPAEDIC SURGERY

## 2018-04-24 PROCEDURE — 74011250636 HC RX REV CODE- 250/636: Performed by: PHYSICIAN ASSISTANT

## 2018-04-24 PROCEDURE — 71045 X-RAY EXAM CHEST 1 VIEW: CPT

## 2018-04-24 PROCEDURE — 97535 SELF CARE MNGMENT TRAINING: CPT

## 2018-04-24 RX ADMIN — FERROUS SULFATE TAB 325 MG (65 MG ELEMENTAL FE) 325 MG: 325 (65 FE) TAB at 08:30

## 2018-04-24 RX ADMIN — VITAMIN D, TAB 1000IU (100/BT) 5000 UNITS: 25 TAB at 08:30

## 2018-04-24 RX ADMIN — FERROUS SULFATE TAB 325 MG (65 MG ELEMENTAL FE) 325 MG: 325 (65 FE) TAB at 21:15

## 2018-04-24 RX ADMIN — DOCUSATE SODIUM 100 MG: 100 CAPSULE, LIQUID FILLED ORAL at 17:56

## 2018-04-24 RX ADMIN — LOSARTAN POTASSIUM 100 MG: 50 TABLET ORAL at 08:31

## 2018-04-24 RX ADMIN — ACETAMINOPHEN 650 MG: 325 TABLET ORAL at 17:56

## 2018-04-24 RX ADMIN — FERROUS SULFATE TAB 325 MG (65 MG ELEMENTAL FE) 325 MG: 325 (65 FE) TAB at 15:53

## 2018-04-24 RX ADMIN — DOCUSATE SODIUM 100 MG: 100 CAPSULE, LIQUID FILLED ORAL at 08:30

## 2018-04-24 RX ADMIN — OXYCODONE HYDROCHLORIDE 10 MG: 5 TABLET ORAL at 16:50

## 2018-04-24 RX ADMIN — LORATADINE 10 MG: 10 TABLET ORAL at 08:31

## 2018-04-24 RX ADMIN — ASPIRIN 325 MG: 325 TABLET, FILM COATED ORAL at 17:56

## 2018-04-24 RX ADMIN — ACETAMINOPHEN 650 MG: 325 TABLET ORAL at 06:37

## 2018-04-24 RX ADMIN — MELOXICAM 7.5 MG: 7.5 TABLET ORAL at 08:30

## 2018-04-24 RX ADMIN — OXYCODONE HYDROCHLORIDE 5 MG: 5 TABLET ORAL at 03:29

## 2018-04-24 RX ADMIN — SIMVASTATIN 20 MG: 20 TABLET, FILM COATED ORAL at 21:15

## 2018-04-24 RX ADMIN — KETOROLAC TROMETHAMINE 15 MG: 15 INJECTION, SOLUTION INTRAMUSCULAR; INTRAVENOUS at 17:56

## 2018-04-24 RX ADMIN — ACETAMINOPHEN 650 MG: 325 TABLET ORAL at 12:13

## 2018-04-24 RX ADMIN — KETOROLAC TROMETHAMINE 15 MG: 15 INJECTION, SOLUTION INTRAMUSCULAR; INTRAVENOUS at 06:37

## 2018-04-24 RX ADMIN — OXYCODONE HYDROCHLORIDE 10 MG: 5 TABLET ORAL at 21:15

## 2018-04-24 RX ADMIN — ASPIRIN 325 MG: 325 TABLET, FILM COATED ORAL at 08:30

## 2018-04-24 RX ADMIN — MONTELUKAST SODIUM 10 MG: 10 TABLET, FILM COATED ORAL at 21:15

## 2018-04-24 RX ADMIN — PANTOPRAZOLE SODIUM 40 MG: 40 TABLET, DELAYED RELEASE ORAL at 08:31

## 2018-04-24 RX ADMIN — KETOROLAC TROMETHAMINE 15 MG: 15 INJECTION, SOLUTION INTRAMUSCULAR; INTRAVENOUS at 12:13

## 2018-04-24 RX ADMIN — AMLODIPINE BESYLATE 5 MG: 5 TABLET ORAL at 08:31

## 2018-04-24 RX ADMIN — MELOXICAM 7.5 MG: 7.5 TABLET ORAL at 17:56

## 2018-04-24 RX ADMIN — OXYCODONE HYDROCHLORIDE 10 MG: 5 TABLET ORAL at 07:40

## 2018-04-24 RX ADMIN — Medication 2 G: at 03:29

## 2018-04-24 NOTE — PROGRESS NOTES
8227  Bedside and Verbal shift change report given to Iain Lamas RN, by ESVIN Sim RN. Report included the following information SBAR, Kardex, OR Summary, Intake/Output and MAR.

## 2018-04-24 NOTE — PROGRESS NOTES
Problem: Mobility Impaired (Adult and Pediatric)  Goal: *Acute Goals and Plan of Care (Insert Text)  In 1-7 days pt will be able to perform:  ST.  Bed mobility:  Rolling L to R to L modified independent for positioning. 2.  Supine to sit to supine S with HR for meals. 3.  Sit to stand to sit S with RW in prep for ambulation. LT.  Gait:  Ambulate >150ft S with RW, WBAT, for home/community mobility. 2.  Activity tolerance: Tolerate up in chair 1-2 hours for ADLs. 3.  Patient/Family Education:  Patient/family to be independent with HEP for follow-up care and safe discharge. Outcome: Progressing Towards Goal  physical Therapy TREATMENT    Patient: Joyce Collado (73 y.o. female)  Date: 2018  Diagnosis: RIGHT KNEE OSTEOARTHRITIS  Osteoarthritis of left knee Osteoarthritis of right knee  Procedure(s) (LRB):  RIGHT TOTAL KNEE REPLACEMENT (Right) 1 Day Post-Op  Precautions: Fall, WBAT   Chart, physical therapy assessment, plan of care and goals were reviewed. ASSESSMENT:  Pt seen in supine prior to session w/ IV donned and B/L SCDs. Pt reported 6/10 pain in R knee prior to session. Pt able to perform therex activity prior to session w/ min VCing and no difficulty. Pt able to ambulate 80 ft w/ RW/GB but demonstrates a step to, antalgic gait pattern, decrease stride length, decrease paul, and decrease step clearance. Pt reported decrease activity tolerance and was transferred back to bed. Pt left in supine after session, call bell and tray in reach, B/L SCDs donned, nurse notified after session. Progression toward goals:  [x]      Improving appropriately and progressing toward goals  []      Improving slowly and progressing toward goals  []      Not making progress toward goals and plan of care will be adjusted     PLAN:  Patient continues to benefit from skilled intervention to address the above impairments. Continue treatment per established plan of care.   Discharge Recommendations: Rehab  Further Equipment Recommendations for Discharge:  rolling walker     SUBJECTIVE:   Patient stated I feel pretty good.     OBJECTIVE DATA SUMMARY:   Critical Behavior:  Neurologic State: Alert, Appropriate for age  Orientation Level: Oriented X4  Cognition: Appropriate decision making, Appropriate for age attention/concentration, Appropriate safety awareness, Follows commands  Safety/Judgement: Awareness of environment  Functional Mobility Training:  Bed Mobility:  Supine to Sit: Contact guard assistance  Sit to Supine: Contact guard assistance; Additional time   Transfers:  Sit to Stand: Contact guard assistance  Stand to Sit: Contact guard assistance  Balance:  Sitting: Intact  Standing: Intact; With support  Ambulation/Gait Training:  Distance (ft): 80 Feet (ft)  Assistive Device: Gait belt;Walker, rolling  Ambulation - Level of Assistance: Contact guard assistance  Gait Abnormalities: Antalgic;Decreased step clearance; Step to gait  Right Side Weight Bearing: As tolerated  Base of Support: Shift to left  Stance: Right decreased  Speed/Louise: Slow  Step Length: Left shortened;Right shortened  Swing Pattern: Left asymmetrical;Right asymmetrical  Interventions: Safety awareness training; Tactile cues; Verbal cues  Therapeutic Exercises:       EXERCISE   Sets   Reps   Active Active Assist   Passive Self ROM   Comments   Ankle Pumps 1 10  [x] [] [] []    Quad Sets/Glut Sets 1 10 [x] [] [] [] Hold for 5 secs   Hamstring Sets   [] [] [] []    Short Arc Quads 1 10 [x] [] [] []    Heel Slides 1 10 [] [] [] [x]    Straight Leg Raises 1 10 [x] [] [] []    Hip Abd/Add   [] [] [] []    Long Arc Quads 1 10 [x] [] [] []    Seated Marching   [] [] [] []    Standing Marching   [] [] [] []       [] [] [] []      Pain:  Pain Scale 1: Numeric (0 - 10)  Pain Intensity 1: 5  Pain Location 1: Knee  Pain Orientation 1: Right  Activity Tolerance:   Good  Please refer to the flowsheet for vital signs taken during this treatment.   After treatment:   [] Patient left in no apparent distress sitting up in chair  [x] Patient left in no apparent distress in bed  [x] Call bell left within reach  [x] Nursing notified  [] Caregiver present  [] Bed alarm activated      Luz Elena Colbert, PT   Time Calculation: 26 mins

## 2018-04-24 NOTE — PROGRESS NOTES
Patient was visited by [facility]. Volunteer conducted a Spiritual Care Screening and reported no needs to this . Spiritual Care literature was provided. Chaplains will continue to follow and will provide pastoral care as needed or requested. [address], M.Div.   Board Certified   [phone] - Office

## 2018-04-24 NOTE — PROGRESS NOTES
Reason for Admission:   RTKR                   RRAT Score:          10           Plan for utilizing home health:    n/a      Likelihood of Readmission:  low                         Transition of Care Plan:                    Chart reviewed, noted plan for SNF, met with pt at bedside. FOC offered, pt chose [facility] for follow up; referral placed with CMS. Plan: pending insurance approval: [place], [place] . Report to [phone]. Please include all hard scripts for controlled substances, med rec and dc summary in packet. Please medicate for pain prior to dc if possible and needed to help offset delay when patient first arrives to facility. Pt will need medical transport for safety, is aware she may incur cost for transport. Care Management Interventions  PCP Verified by CM:  Yes  Transition of Care Consult (CM Consult): SNF  Partner SNF: Yes  Discharge Durable Medical Equipment: No  Physical Therapy Consult: Yes  Occupational Therapy Consult: Yes  Current Support Network: Lives Alone  Confirm Follow Up Transport: Family  Plan discussed with Pt/Family/Caregiver: Yes  Freedom of Choice Offered: Yes  Discharge Location  Discharge Placement: Skilled nursing facility

## 2018-04-24 NOTE — PROGRESS NOTES
Problem: Self Care Deficits Care Plan (Adult)  Goal: *Acute Goals and Plan of Care (Insert Text)  Occupational Therapy EVALUATION/discharge    Patient: Tyesha Nascimento (31 y.o. female)  Date: 4/24/2018  Primary Diagnosis: RIGHT KNEE OSTEOARTHRITIS  Osteoarthritis of left knee  Procedure(s) (LRB):  RIGHT TOTAL KNEE REPLACEMENT (Right) 1 Day Post-Op   Precautions:   Fall, WBAT    ASSESSMENT AND RECOMMENDATIONS:  Pt is a 65 y/o female s/p R TKA. Pt OOB seated in chair when OT arrived to room. Currently, pt is functioning at Set up/S level for completion of LB ADL tasks implementing adaptive strategies discussed during session and using AE (LH shoehorn and sockaid) prn. Pt is able to perform functional transfers at S level using RW. Reviewed home safety, to include shower transfers when appropriate and pt verbalized understanding. Pt has a RW, 3n1 commode and shower seat available at home. No further acute care OT/ADL concerns or questions at this time. Pt planning to go to rehab at d/c.         Discharge Recommendations: Rehab  Further Equipment Recommendations for Discharge: N/A      SUBJECTIVE:   Patient alert and cooperative    OBJECTIVE DATA SUMMARY:     Past Medical History:   Diagnosis Date    Arthritis     all over    Hypertension many years    Ill-defined condition     benign murmur as a child    Ill-defined condition     environmental allergies, ragweed, pollen    Osteoarthritis of right knee 4/21/2018     Past Surgical History:   Procedure Laterality Date    HX APPENDECTOMY      HX HYSTERECTOMY  2015    HX KNEE REPLACEMENT Left 20 teens    HX ORTHOPAEDIC Bilateral 2000's    ctr    HX ORTHOPAEDIC  2000's    trigger thumb    HX TONSILLECTOMY      as a child     Barriers to Learning/Limitations: None  Compensate with: visual, verbal, tactile, kinesthetic cues/model    Prior Level of Function/Home Situation:   Home Situation  Home Environment: Private residence  # Steps to Enter: 2  Rails to Enter: No  One/Two Story Residence: One story  Living Alone: Yes  Support Systems: Family member(s)  Patient Expects to be Discharged to[de-identified] Rehabilitation facility  Current DME Used/Available at Home: Adaptive dressing aides, Cane, straight, Commode, bedside, Raised toilet seat, Shower chair, Walker, rolling  Tub or Shower Type: Shower  [x]     Right hand dominant   []     Left hand dominant  Cognitive/Behavioral Status:  Neurologic State: Alert  Orientation Level: Oriented X4  Cognition: Appropriate decision making; Appropriate for age attention/concentration; Appropriate safety awareness; Follows commands  Safety/Judgement: Awareness of environment  Skin: dressing intact R knee incision  Edema: R LE  Coordination:  Coordination: Within functional limits  Balance:  Sitting: Intact  Standing: Intact; With support  Strength:  Strength: Generally decreased, functional R LE  Tone & Sensation:  Tone: Normal  Sensation: Intact  Range of Motion:  AROM: Generally decreased, functional R LE  Functional Mobility and Transfers for ADLs:  Bed Mobility:     Supine to Sit: Contact guard assistance  Sit to Supine: Contact guard assistance; Additional time     Transfers:  Sit to Stand: Supervision    ADL Assessment:    Oral Facial Hygiene/Grooming: Setup    Bathing: Minimum assistance    Upper Body Dressing: Setup    Lower Body Dressing: Minimum assistance    Toileting: Supervision     ADL Intervention:    Lower Body Dressing Assistance  Pants With Elastic Waist: Supervision/set-up  Socks: Modified independent  Slip on Shoes with Back: Supervision/set-up  Position Performed: Seated in chair  Adaptive Equipment Used: Long handled shoe horn;Sock aid    Cognitive Retraining  Safety/Judgement: Awareness of environment    Pain:  Pre-treatment: 6/10 R knee  Post-treatment: 6/10 R knee  Activity Tolerance:   good  Please refer to the flowsheet for vital signs taken during this treatment.   After treatment:   [x]  Patient left in no apparent distress sitting up in chair  []  Patient left in no apparent distress in bed  [x]  Call bell left within reach  []  Nursing notified  []  Caregiver present  []  Bed alarm activated    COMMUNICATION/EDUCATION:   Communication/Collaboration:  [x]      Home safety education was provided and the patient/caregiver indicated understanding. [x]      Patient/family have participated as able and agree with findings and recommendations. []      Patient is unable to participate in plan of care at this time. Thank you for this referral.  Kathleen Morrison  OTR/L  Time Calculation: 35 mins    G-Codes (GP)  Self Care   Current  CI= 1-19%   D/C  CI= 1-19%  The severity rating is based on the professional judgement & direct observation of Level of Assistance required for Functional Mobility and ADLs. Eval Complexity: History: LOW Complexity : Brief history review ; Examination: LOW Complexity : 1-3 performance deficits relating to physical, cognitive , or psychosocial skils that result in activity limitations and / or participation restrictions ; Decision Making:MEDIUM Complexity : Patient may present with comorbidities that affect occupational performnce.  Miniml to moderate modification of tasks or assistance (eg, physical or verbal ) with assesment(s) is necessary to enable patient to complete evaluation

## 2018-04-24 NOTE — PROGRESS NOTES
Progress Note        Patient: Giselle Han MRN: [id number]  SSN: [id number]    YOB: 1940  Age: 66 y.o. Sex: female      1 Day Post-Op status post Procedure(s) (LRB):  RIGHT TOTAL KNEE REPLACEMENT (Right)    Admit Date: 2018  Admit Diagnosis: RIGHT KNEE OSTEOARTHRITIS  Osteoarthritis of left knee    Subjective:      Doing well. No complaints. No SOB. No Chest Pain. No Nausea or Vomiting. No problems eating or voiding. Objective:        Temp (24hrs), Av.9 °F (36.6 °C), Min:97.2 °F (36.2 °C), Max:98.7 °F (37.1 °C)    Body mass index is 33.17 kg/(m^2). Patient Vitals for the past 12 hrs:   BP Temp Pulse Resp SpO2   18 0723 126/61 97.8 °F (36.6 °C) 88 16 100 %   18 0311 127/67 97.7 °F (36.5 °C) 69 16 100 %   18 2309 122/66 98.1 °F (36.7 °C) 75 16 100 %     Recent Labs      18   0359   HGB  9.6*   HCT  30.0*   NA  140   K  3.9   CL  106   CO2  26   BUN  18   CREA  0.86   GLU  134*       Physical Exam:  Vital Signs are Stable. No Acute Distress. Alert and Oriented. Negative Homans sign. Toes AROM Full. Neurovascular exam is normal.    Dressing is Clean, Dry, and Intact. Assessment/Plan:     1. Continue oob/rehab  2.  D/c planning    Continue PT/OT  Continue CPM/ROM    Discharge Plan: SNF    Signed By: Jammie Emmanuel MD     2018

## 2018-04-24 NOTE — PROGRESS NOTES
1915  Bedside and Verbal shift change report given to ESVIN Cesar, RN by Daisy aFgan RN. Report included the following information SBAR, Kardex, OR Summary, Intake/Output and MAR.

## 2018-04-24 NOTE — PROGRESS NOTES
Assumed care at this time. Assessment completed in flowsheet. Pt is alert and oriented x 4. Denies SOB and chest pain. Pt lungs clear bilaterally. Capillary refill less than 3 seconds. Pt denies numbness and tingling to all extremities. Stated pain  5/10. Pt has 18G IV to the LT inner arm. Pt has mepilex dressing. Plexis and TEDs applied to bilaterally. Pt encouraged to continue use of IS, Pt verbalized understanding. Ice pack applied. Call light and possessions within reach. Bed is in the lowest position. Will continue to monitor.

## 2018-04-25 VITALS
SYSTOLIC BLOOD PRESSURE: 165 MMHG | HEIGHT: 65 IN | TEMPERATURE: 98.2 F | WEIGHT: 199.31 LBS | RESPIRATION RATE: 18 BRPM | HEART RATE: 85 BPM | OXYGEN SATURATION: 99 % | DIASTOLIC BLOOD PRESSURE: 70 MMHG | BODY MASS INDEX: 33.21 KG/M2

## 2018-04-25 LAB
ANION GAP SERPL CALC-SCNC: 8 MMOL/L (ref 3–18)
BUN SERPL-MCNC: 15 MG/DL (ref 7–18)
BUN/CREAT SERPL: 21 (ref 12–20)
CALCIUM SERPL-MCNC: 9.1 MG/DL (ref 8.5–10.1)
CHLORIDE SERPL-SCNC: 107 MMOL/L (ref 100–108)
CO2 SERPL-SCNC: 27 MMOL/L (ref 21–32)
CREAT SERPL-MCNC: 0.72 MG/DL (ref 0.6–1.3)
ERYTHROCYTE [DISTWIDTH] IN BLOOD BY AUTOMATED COUNT: 15.6 % (ref 11.6–14.5)
GLUCOSE SERPL-MCNC: 102 MG/DL (ref 74–99)
HCT VFR BLD AUTO: 26.2 % (ref 35–45)
HGB BLD-MCNC: 8.5 G/DL (ref 12–16)
MCH RBC QN AUTO: 26.3 PG (ref 24–34)
MCHC RBC AUTO-ENTMCNC: 32.4 G/DL (ref 31–37)
MCV RBC AUTO: 81.1 FL (ref 74–97)
PLATELET # BLD AUTO: 173 K/UL (ref 135–420)
PMV BLD AUTO: 11.4 FL (ref 9.2–11.8)
POTASSIUM SERPL-SCNC: 3.8 MMOL/L (ref 3.5–5.5)
RBC # BLD AUTO: 3.23 M/UL (ref 4.2–5.3)
SODIUM SERPL-SCNC: 142 MMOL/L (ref 136–145)
WBC # BLD AUTO: 8.1 K/UL (ref 4.6–13.2)

## 2018-04-25 PROCEDURE — 97530 THERAPEUTIC ACTIVITIES: CPT

## 2018-04-25 PROCEDURE — 85027 COMPLETE CBC AUTOMATED: CPT | Performed by: PHYSICIAN ASSISTANT

## 2018-04-25 PROCEDURE — 97116 GAIT TRAINING THERAPY: CPT

## 2018-04-25 PROCEDURE — 74011250637 HC RX REV CODE- 250/637: Performed by: ORTHOPAEDIC SURGERY

## 2018-04-25 PROCEDURE — 36415 COLL VENOUS BLD VENIPUNCTURE: CPT | Performed by: PHYSICIAN ASSISTANT

## 2018-04-25 PROCEDURE — 74011250637 HC RX REV CODE- 250/637: Performed by: PHYSICIAN ASSISTANT

## 2018-04-25 PROCEDURE — 80048 BASIC METABOLIC PNL TOTAL CA: CPT | Performed by: PHYSICIAN ASSISTANT

## 2018-04-25 RX ADMIN — MELOXICAM 7.5 MG: 7.5 TABLET ORAL at 08:32

## 2018-04-25 RX ADMIN — ACETAMINOPHEN 650 MG: 325 TABLET ORAL at 07:08

## 2018-04-25 RX ADMIN — PANTOPRAZOLE SODIUM 40 MG: 40 TABLET, DELAYED RELEASE ORAL at 08:32

## 2018-04-25 RX ADMIN — OXYCODONE HYDROCHLORIDE 10 MG: 5 TABLET ORAL at 08:33

## 2018-04-25 RX ADMIN — Medication 10 ML: at 13:01

## 2018-04-25 RX ADMIN — FERROUS SULFATE TAB 325 MG (65 MG ELEMENTAL FE) 325 MG: 325 (65 FE) TAB at 08:32

## 2018-04-25 RX ADMIN — DOCUSATE SODIUM 100 MG: 100 CAPSULE, LIQUID FILLED ORAL at 08:32

## 2018-04-25 RX ADMIN — OXYCODONE HYDROCHLORIDE 10 MG: 5 TABLET ORAL at 13:00

## 2018-04-25 RX ADMIN — OXYCODONE HYDROCHLORIDE 10 MG: 5 TABLET ORAL at 00:43

## 2018-04-25 RX ADMIN — ACETAMINOPHEN 650 MG: 325 TABLET ORAL at 00:43

## 2018-04-25 RX ADMIN — LOSARTAN POTASSIUM 100 MG: 50 TABLET ORAL at 08:32

## 2018-04-25 RX ADMIN — LORATADINE 10 MG: 10 TABLET ORAL at 08:32

## 2018-04-25 RX ADMIN — OXYCODONE HYDROCHLORIDE 10 MG: 5 TABLET ORAL at 04:43

## 2018-04-25 RX ADMIN — ACETAMINOPHEN 650 MG: 325 TABLET ORAL at 11:21

## 2018-04-25 RX ADMIN — ASPIRIN 325 MG: 325 TABLET, FILM COATED ORAL at 08:32

## 2018-04-25 RX ADMIN — AMLODIPINE BESYLATE 5 MG: 5 TABLET ORAL at 08:32

## 2018-04-25 RX ADMIN — VITAMIN D, TAB 1000IU (100/BT) 5000 UNITS: 25 TAB at 08:31

## 2018-04-25 NOTE — PROGRESS NOTES
Problem: Falls - Risk of  Goal: *Absence of Falls  Document Prasad Fall Risk and appropriate interventions in the flowsheet.    Outcome: Progressing Towards Goal  Fall Risk Interventions:  Mobility Interventions: Assess mobility with egress test, Utilize walker, cane, or other assitive device, OT consult for ADLs, Patient to call before getting OOB, PT Consult for mobility concerns, PT Consult for assist device competence         Medication Interventions: Assess postural VS orthostatic hypotension, Teach patient to arise slowly, Patient to call before getting OOB    Elimination Interventions: Call light in reach, Elevated toilet seat, Toileting schedule/hourly rounds

## 2018-04-25 NOTE — PROGRESS NOTES
1915 Assumed care of patient from Daniel Miller RN. Shift assessment initiated. Pain voiced at 7/10, will medicate per [facility] appropriate time. Ice applied to site. IS encouraged. All questions and concerns answered. All needs met. Spouse at bedside. No signs of distress noted. Call bell/phone within reach. No further needs at this time. 2023 After ice applied pain has decreased to 5/10, will continue to monitor for changes and medicate at appropriate time. 3281 Patient OOB in chair. 0730 Bedside and Verbal shift change report given to Etienne Okeefe RN (oncoming nurse) by Lai Ayala RN (offgoing nurse). Report included the following information SBAR, Kardex, MAR, Recent Results and Med Rec Status.

## 2018-04-25 NOTE — PROGRESS NOTES
Problem: Mobility Impaired (Adult and Pediatric)  Goal: *Acute Goals and Plan of Care (Insert Text)  In 1-7 days pt will be able to perform:  ST.  Bed mobility:  Rolling L to R to L modified independent for positioning. 2.  Supine to sit to supine S with HR for meals. 3.  Sit to stand to sit S with RW in prep for ambulation. LT.  Gait:  Ambulate >150ft S with RW, WBAT, for home/community mobility. 2.  Activity tolerance: Tolerate up in chair 1-2 hours for ADLs. 3.  Patient/Family Education:  Patient/family to be independent with HEP for follow-up care and safe discharge. Outcome: Progressing Towards Goal  physical Therapy TREATMENT    Patient: Zbigniew Luna (59 y.o. female)  Date: 2018  Diagnosis: RIGHT KNEE OSTEOARTHRITIS  Osteoarthritis of left knee Osteoarthritis of right knee  Procedure(s) (LRB):  RIGHT TOTAL KNEE REPLACEMENT (Right) 2 Days Post-Op  Precautions: Fall, WBAT   Chart, physical therapy assessment, plan of care and goals were reviewed. ASSESSMENT:  Pt showing improved mobility, and increasing ambulation distance with RW. Very slow step to paul noted. Moderate fatigue post ambulation. Progression toward goals:  []      Improving appropriately and progressing toward goals  [x]      Improving slowly and progressing toward goals  []      Not making progress toward goals and plan of care will be adjusted     PLAN:  Patient continues to benefit from skilled intervention to address the above impairments. Continue treatment per established plan of care.   Discharge Recommendations:  Rehab  Further Equipment Recommendations for Discharge:  rolling walker     SUBJECTIVE:   Patient stated  I want to see the      OBJECTIVE DATA SUMMARY:   Critical Behavior:  Neurologic State: Alert, Appropriate for age  Orientation Level: Oriented X4  Cognition: Appropriate decision making, Appropriate for age attention/concentration, Appropriate safety awareness, Follows commands  Safety/Judgement: Awareness of environment  Functional Mobility Training:  Transfers:  Sit to Stand: Supervision  Stand to Sit: Supervision  Balance:  Sitting: Intact  Standing: Intact; With support  Ambulation/Gait Training:  Distance (ft): 150 Feet (ft)  Assistive Device: Walker, rolling;Gait belt  Ambulation - Level of Assistance: Contact guard assistance  Gait Abnormalities: Decreased step clearance; Step to gait  Right Side Weight Bearing: As tolerated  Base of Support: Shift to left  Speed/Louise: Slow  Step Length: Right shortened;Left shortened  Swing Pattern: Left asymmetrical;Right asymmetrical  Interventions: Verbal cues    Pain:  Pain Scale 1: Numeric (0 - 10)  Pain Intensity 1: 5  Pain Location 1: Knee  Pain Orientation 1: Right  Pain Description 1: Aching  Pain Intervention(s) 1: Declines  Activity Tolerance:   Fair     After treatment:   [x] Patient left in no apparent distress sitting up in chair  [] Patient left in no apparent distress in bed  [x] Call bell left within reach  [] Nursing notified  [] Caregiver present  [] Bed alarm activated      Leighton Summers PTA   Time Calculation: 39 mins

## 2018-04-25 NOTE — PROGRESS NOTES
2898 Assumed care of pt at this time. Pt in chair with no signs of distress. Pt left with call light within reach and encouraged to call for assistance. 0681 Head to toe assessment completed at this time  Patient is A&OX4. Pt denies N/V chest pain and SOB or distress. Pt is calm and cooperative. Pedal pulses are present. Capillary refill less than 3 seconds. Skin in warm and dry with mepilex dressing on right knee and is CDI. Lungs are clear bilaterally. Patient instructed on use and reason for incentive spirometer. Bowel sounds are active. Abdomen is soft and non-tender. VALERIY & Plexi in place bilaterally . Positive dorsalis pedis pulse, sensation, warm. No tingling or numbness to lower extremities. 18 g needle in the RFA. Pain scale explained to patient. Reasons for taking PRN meds explained to patient. Patient instructed to call for prn when needed. Pain level is 5, medicated as per MAR. Patient was oriented to call bell and bed function. Will continue to monitor    1121 pt is up in chair and visitor at bed side, no complain and concern at this time    1300 Pt state pain as 7/10. Pt received medication as per MAR. Potential medication side effects explained to patient, patient verbalizes understanding, opportunities for questions provided. Patient stable, no apparent distress at this time, bed in locked position, call bell within reach. 0 talk with SULY Sanchez about if pt can go SNF today he stated that's fine.     1270 Pt is being transported by Medical transportation,

## 2018-04-25 NOTE — PROGRESS NOTES
Insurance has approved for pt to transition to [facility] today. Transport set for 2pm, facility aware. Per previous note:  [facility] [place], [place] . Report to [phone] ext [phone]. Please include all hard scripts for controlled substances, med rec and dc summary in packet. Please medicate for pain prior to dc if possible and needed to help offset delay when patient first arrives to facility. Pt will need medical transport for safety, is aware she may incur cost for transport. Care Management Interventions  PCP Verified by CM: Yes  Mode of Transport at Discharge: [address]  [address] Time of Discharge: 1400  Transition of Care Consult (CM Consult): SNF  Partner SNF: Yes  Discharge Durable Medical Equipment: No  Physical Therapy Consult: Yes  Occupational Therapy Consult: Yes  Current Support Network: Lives Alone  Confirm Follow Up Transport: Family  Plan discussed with Pt/Family/Caregiver: Yes  Freedom of Choice Offered: Yes  Discharge Location  Discharge Placement: Skilled nursing facility      Transition of Care ([place]) Plan:           [facility] Transportation:   How is patient being transported at discharge? Family/Friend      When? Once cleared by Therapy between 12-2pm     Is transport scheduled? N/A      Follow-up appointment and transportation:   PCP/Specialist?  See AVS for Appointment         Who is transporting to the follow-up appointment? Family/Friend      Is transport for follow up appointment scheduled? N/A    Communication plan (with patient/family): Who is being called? Patient or Next of Kin? Responsible party? Patient      What number(s) is to be used? See Facesheet      What service provider is calling for [facility] services? When are they calling? 24-48 hours following discharge    Readmission Risk?   (Green/Low; Yellow/Moderate; Red/High):  Hima Bashir

## 2018-04-25 NOTE — ROUTINE PROCESS
TRANSFER - OUT REPORT:    Verbal report given to PIPER helm(name) on JEREMIAH Hernández  being transferred to [facility](unit) for routine post - op       Report consisted of patients Situation, Background, Assessment and   Recommendations(SBAR). Information from the following report(s) SBAR, Kardex, Intake/Output, MAR and Accordion was reviewed with the receiving nurse. Opportunity for questions and clarification was provided.

## 2018-04-25 NOTE — PROGRESS NOTES
Progress Note        Patient: Anel Combs MRN: [id number]  SSN: [id number]    YOB: 1940  Age: 66 y.o. Sex: female      2 Days Post-Op status post Procedure(s) (LRB):  RIGHT TOTAL KNEE REPLACEMENT (Right)    Admit Date: 2018  Admit Diagnosis: RIGHT KNEE OSTEOARTHRITIS  Osteoarthritis of left knee    Subjective:      Doing well. No complaints. No SOB. No Chest Pain. No Nausea or Vomiting. No problems eating or voiding. Objective:        Temp (24hrs), Av.1 °F (36.7 °C), Min:97.5 °F (36.4 °C), Max:98.6 °F (37 °C)    Body mass index is 33.17 kg/(m^2). Patient Vitals for the past 12 hrs:   BP Temp Pulse Resp SpO2   18 0311 164/65 98.6 °F (37 °C) 71 16 100 %   18 0022 140/52 98.4 °F (36.9 °C) 85 16 98 %   18 2020 150/58 97.5 °F (36.4 °C) 73 16 100 %     Recent Labs      18   0330   HGB  8.5*   HCT  26.2*   NA  142   K  3.8   CL  107   CO2  27   BUN  15   CREA  0.72   GLU  102*       Physical Exam:  Vital Signs are Stable. No Acute Distress. Alert and Oriented. Negative Homans sign. Toes AROM Full. Neurovascular exam is normal.    Dressing is Clean, Dry, and Intact. Assessment/Plan:     1. Continue oob/rehab  2.  D/c planning    Continue PT/OT  Continue CPM/ROM    Discharge Plan: SNF    Signed By: Ashley Cordova MD     2018

## 2018-04-25 NOTE — PROGRESS NOTES
Problem: Falls - Risk of  Goal: *Absence of Falls  Document Prasad Fall Risk and appropriate interventions in the flowsheet.    Outcome: Progressing Towards Goal  Fall Risk Interventions:  Mobility Interventions: Patient to call before getting OOB, Utilize walker, cane, or other assitive device         Medication Interventions: Patient to call before getting OOB, Teach patient to arise slowly    Elimination Interventions: Call light in reach, Elevated toilet seat, Patient to call for help with toileting needs

## 2019-12-12 ENCOUNTER — HOSPITAL ENCOUNTER (OUTPATIENT)
Dept: PHYSICAL THERAPY | Age: 79
Discharge: HOME | End: 2019-12-12
Payer: MEDICARE

## 2019-12-12 PROCEDURE — 97162 PT EVAL MOD COMPLEX 30 MIN: CPT | Performed by: GENERAL ACUTE CARE HOSPITAL

## 2019-12-12 PROCEDURE — 97110 THERAPEUTIC EXERCISES: CPT | Performed by: GENERAL ACUTE CARE HOSPITAL

## 2019-12-12 NOTE — PROGRESS NOTES
[facility] PHYSICAL THERAPY AT [address]  Phone: [phone] Fax: [phone] / [address] [place] [address] PHYSICAL THERAPY SERVICES  Patient Name: Katlyn Sorensen : 1940   Medical   Diagnosis: Pain in right knee [M25.561]  Other acute postprocedural pain [G89.18]  Pain, neck [M54.2] Treatment Diagnosis: Pain in right knee [M25.561]  Other acute postprocedural pain [G89.18]  Pain, neck [M54.2]   Onset Date: R TKA revision 10/25/19     Referral Source: Alexsandra Mitchell MD [facility] in [place]): 2019   Prior Hospitalization: See medical history Provider #: [id number]   Prior Level of Function: Mod I using SPC for all mobility. Retired. Lives alone. Comorbidities: HTN, arthritis, L TKA    Medications: Verified on Patient Summary List   The Plan of Care and following information is based on the information from the initial evaluation.   ========================================================================  Assessment / key information:  Pt is a 79 y/o female who presents s/p R TKA revision on 10/25/19 (went to [place], [place] to have revision). Pt states she had initial R TKA in 403 but had complications due to \"the implant shifting\" causing her leg to be externally rotated. Pt is currently ambulating with a SPC. She presents with reduced R knee AROM and tight HS/gastroc. Noted edema in R calf with firmness felt throughout calf. Pt will benefit from skilled PT in order to address current deficits. **Patient also presents with script for c/s pain. Reports onset of increased cervical flexion and having difficulty holding her head up. She frequently has to hold her head up using her arms for support. States this has progressively become worse over the past year. Also, reports weakness in L shoulder. Patient wishes to have x-ray and imaging consult FU prior to initiation of any therapy.  Plan to address cervical impairments once knee POC has been resolved.      Pain: 4/10 currently, 10/10 at worst  Aggravating: lifting, prolonged walking/standing  Easing: ice, Advil throughout the day    Observation: incision healing well, noted darkened/bruised skin along medial tibia  Palpation: +ttp medial tibial shaft, VL, ITB    Knee AROM: R 0-107, L 7-98    Strength: R LE  Hip: flexion 4/5 Ext 4-/5 abd 3+/5  Knee: flexion 4/5 Ext 4/5  Bridge: 70%    SLR: lacking TKE     Sit to stand: (+) valgus, mod difficulty without use of hands     Gait: Shortened stride length, antalgic, reduced R TKE during stance phase, reduced heel strike, step to pattern         ========================================================================  Eval Complexity: History: MEDIUM  Complexity : 1-2 comorbidities / personal factors will impact the outcome/ POC Exam:HIGH Complexity : 4+ Standardized tests and measures addressing body structure, function, activity limitation and / or participation in recreation  Presentation: MEDIUM Complexity : Evolving with changing characteristics  Clinical Decision Making:MEDIUM Complexity : FOTO score of 26-74Overall Complexity:MEDIUM  Problem List: pain affecting function, decrease ROM, decrease strength, edema affecting function, impaired gait/ balance, decrease ADL/ functional abilitiies, decrease activity tolerance, decrease flexibility/ joint mobility and decrease transfer abilities   Treatment Plan may include any combination of the following: Therapeutic exercise, Therapeutic activities, Neuromuscular re-education, Physical agent/modality, Gait/balance training, Aquatic therapy, Patient education, Self Care training, Functional mobility training, Home safety training and Stair training  Patient / Family readiness to learn indicated by: asking questions, trying to perform skills and interest  Persons(s) to be included in education: patient (P)  Barriers to Learning/Limitations: None  Measures taken:    Patient Goal (s): \"improve strength and mobility\"   Patient self reported health status: good  Rehabilitation Potential: good   Short Term Goals: To be accomplished in  1  weeks:  1) Pt will be independent and compliant with HEP   Long Term Goals: To be accomplished in  4-6  weeks:  1) Pt will score >54/100 on FOTO to show significant improvement in functional mobility and QOL  2) Pt will display >120 knee flexion AROM for improved ease of dressing ADL's  3) Pt will display >4/5 strength in R hip for ease of stair navigation  4) Pt will display full TKE on R LE for improved gait mechanics     Frequency / Duration:   Patient to be seen  2-3  times per week for 4-6  weeks:  Patient / Caregiver education and instruction: activity modification and exercises  Therapist Signature: Naz Sandhu PT Date: 44/64/1609   Certification Period: 12/12/19 to 2/12/19 Time: 12:53 PM   ========================================================================  I certify that the above Physical Therapy Services are being furnished while the patient is under my care. I agree with the treatment plan and certify that this therapy is necessary. Physician Signature:        Date:       Time:   Please sign and return to [facility] or you may fax the signed copy to [phone]. Thank you.

## 2019-12-13 NOTE — PROGRESS NOTES
PT DAILY TREATMENT NOTE     Patient Name: Jermaine Crouch  Date:2019  : 1940  [x]  Patient  Verified  Payor: John Nowak / Plan: Orquidea Morris / Product Type: Managed Care Medicare /    In time:4:11 pm  Out time:4:55  Total Treatment Time (min): 44  Total Timed Codes (min): 10  1:1 Treatment Time (min): 10   Visit #: 1 of     Treatment Area: Pain in right knee [M25.561]  Other acute postprocedural pain [G89.18]  Pain, neck [M54.2]    SUBJECTIVE  Pain Level (0-10 scale): 4/10  Any medication changes, allergies to medications, adverse drug reactions, diagnosis change, or new procedure performed?: [x] No    [] Yes (see summary sheet for update)  Subjective functional status/changes:   [] No changes reported   Pt is a 79 y/o female who presents s/p R TKA revision on 10/25/19 (went to [place], [place] to have revision). Pt states she had initial R TKA in 47 but had complications due to \"the implant shifting\" causing her leg to be externally rotated. Pt is currently ambulating with a SPC. She presents with reduced R knee AROM and tight HS/gastroc. Noted edema in R calf with firmness felt throughout calf. Pt will benefit from skilled PT in order to address current deficits. **Patient also presents with script for c/s pain. Reports onset of increased cervical flexion and having difficulty holding her head up. She frequently has to hold her head up using her arms for support. States this has progressively become worse over the past year. Also, reports weakness in L shoulder. Patient wishes to have x-ray and imaging consult FU prior to initiation of any therapy.  Plan to address cervical impairments once knee POC has been resolved.      Pain: 4/10 currently, 10/10 at worst  Aggravating: lifting, prolonged walking/standing  Easing: ice, Advil throughout the day     Observation: incision healing well, noted darkened/bruised skin along medial tibia  Palpation: +ttp medial tibial shaft, VL, ITB     Knee AROM: R 0-107, L 7-98     Strength: R LE  Hip: flexion 4/5     Ext 4-/5  abd 3+/5  Knee: flexion 4/5  Ext 4/5  Bridge: 70%     SLR: lacking TKE      Sit to stand: (+) valgus, mod difficulty without use of hands      Gait: Shortened stride length, antalgic, reduced R TKE during stance phase, reduced heel strike, step to pattern       OBJECTIVE      10 min Therapeutic Exercise:  [] See flow sheet :   Rationale: increase ROM and increase strength to improve the patients ability to perform functional ADL's               min Patient Education: [x] Review HEP    [] Progressed/Changed HEP based on:   [] positioning   [] body mechanics   [] transfers   [] heat/ice application        Other Objective/Functional Measures:   Justification for Eval Code Complexity:  Patient History : see POC  Examination see exam   Clinical Presentation: evolving  Clinical Decision Making : FOTO : 44 /100 (see chart for hard copy)    Pain Level (0-10 scale) post treatment: 4/10    ASSESSMENT/Changes in Function: See POC    Patient will continue to benefit from skilled PT services to modify and progress therapeutic interventions, address functional mobility deficits, address ROM deficits, address strength deficits, analyze and address soft tissue restrictions, analyze and cue movement patterns, analyze and modify body mechanics/ergonomics, assess and modify postural abnormalities and instruct in home and community integration to attain remaining goals. [x]  See Plan of Care  []  See progress note/recertification  []  See Discharge Summary         Progress towards goals / Updated goals:  Rehabilitation Potential: good  · Short Term Goals: To be accomplished in  1  weeks:  1) Pt will be independent and compliant with HEP  · Long Term Goals:  To be accomplished in  4-6  weeks:  1) Pt will score >54/100 on FOTO to show significant improvement in functional mobility and QOL  2) Pt will display >120 knee flexion AROM for improved ease of dressing ADL's  3) Pt will display >4/5 strength in R hip for ease of stair navigation  4) Pt will display full TKE on R LE for improved gait mechanics     PLAN  [x]  Upgrade activities as tolerated     []  Continue plan of care  []  Update interventions per flow sheet       []  Discharge due to:_  []  Other:_      Olga Valerio PT 12/13/2019  7:26 AM

## 2019-12-18 ENCOUNTER — HOSPITAL ENCOUNTER (OUTPATIENT)
Dept: PHYSICAL THERAPY | Age: 79
Discharge: HOME | End: 2019-12-18
Payer: MEDICARE

## 2019-12-18 PROCEDURE — 97110 THERAPEUTIC EXERCISES: CPT | Performed by: GENERAL ACUTE CARE HOSPITAL

## 2019-12-18 PROCEDURE — 97140 MANUAL THERAPY 1/> REGIONS: CPT | Performed by: GENERAL ACUTE CARE HOSPITAL

## 2019-12-18 NOTE — PROGRESS NOTES
PT DAILY TREATMENT NOTE     Patient Name: Igor Pena  Date:2019  : 1940  [x]  Patient  Verified  Payor: Pam Jack / Plan: Catherine Blood / Product Type: Managed Care Medicare /    In time:3:35 pm Out time: 4:23 pm   Total Treatment Time (min): 48  Total Timed Codes (min): 38  1:1 Treatment Time (min): 33 (3:40-4:13)  Visit #: 2 of     Treatment Area: Pain in right knee [M25.561]  Other acute postprocedural pain [G89.18]  Pain, neck [M54.2]    SUBJECTIVE  Pain Level (0-10 scale): 2/10  Any medication changes, allergies to medications, adverse drug reactions, diagnosis change, or new procedure performed?: [x] No    [] Yes (see summary sheet for update)  Subjective functional status/changes:   [] No changes reported  Pt reports compliance with HEP; only having difficulty with sidelying Hip ABD.      OBJECTIVE  Modality rationale: decrease edema, decrease inflammation and decrease pain to improve the patients ability to improve activity tolerance for completion of ADL's   Min Type Additional Details    [] Estim: []Att   []Unatt  []TENS instruct                 []IFC  []Premod []NMES                       []Other:  []w/US   []w/ice   []w/heat  Position:  Location:    []  Traction: [] Cervical       []Lumbar                       [] Prone          []Supine                       []Intermittent   []Continuous Lbs:  [] before manual  [] after manual    []  Ultrasound: []Continuous   [] Pulsed                           []1MHz   []3MHz Location:  W/cm2:    []  Iontophoresis with dexamethasone         Location: [] Take home patch   [] In clinic   10 [x]  Ice     []  heat  []  Ice massage Position: semi reclined  Location: R knee    []  Vasopneumatic Device Pressure: [] lo [] med [] hi   Temp: [] lo [] med [] hi   [x] Skin assessment post-treatment:  [x]intact []redness- no adverse reaction       []redness  adverse reaction:       28 min Therapeutic Exercise:  [] See flow sheet :   Rationale: increase ROM and increase strength to improve the patients ability to perform functional LE tasks    10 min Manual Therapy:  Ant/post tibial mobs for increased jt mobility f/b PROM R knee flexion/ext    Rationale: decrease pain, increase ROM and increase tissue extensibility to improve gait mechanics and stair navigation              min Patient Education: [x] Review HEP    [] Progressed/Changed HEP based on:   [] positioning   [] body mechanics   [] transfers   [] heat/ice application        Other Objective/Functional Measures:   (L) knee flexion AROM: 105    Pain Level (0-10 scale) post treatment: 0/10    ASSESSMENT/Changes in Function:   Good tolerance to first FU session with patient requiring VC for proper performance of all newly introduced exercises. Fatigued easily with SLR (flexion and ABD) and required tactile cues for correct performance of hip ABD. Improved knee flexion AROM post MT today. Patient will continue to benefit from skilled PT services to modify and progress therapeutic interventions, address functional mobility deficits, address ROM deficits, address strength deficits, analyze and address soft tissue restrictions, analyze and cue movement patterns, analyze and modify body mechanics/ergonomics, assess and modify postural abnormalities, address imbalance/dizziness and instruct in home and community integration to attain remaining goals. [x]  See Plan of Care  []  See progress note/recertification  []  See Discharge Summary         Progress towards goals / Updated goals: · Short Term Goals: To be accomplished in  1  weeks:  1) Pt will be independent and compliant with HEP -Goal met; pt reports compliance with HEP 12/18/19  · Long Term Goals:  To be accomplished in  4-6  weeks:  1) Pt will score >54/100 on FOTO to show significant improvement in functional mobility and QOL  2) Pt will display >120 knee flexion AROM for improved ease of dressing ADL's  3) Pt will display >4/5 strength in R hip for ease of stair navigation  4) Pt will display full TKE on R LE for improved gait mechanics     PLAN  [x]  Upgrade activities as tolerated     [x]  Continue plan of care  []  Update interventions per flow sheet       []  Discharge due to:_  []  Other:_  Progress to standing exercises ZACHARY Delcid, PT 12/18/2019  2:09 PM

## 2019-12-20 ENCOUNTER — APPOINTMENT (OUTPATIENT)
Dept: PHYSICAL THERAPY | Age: 79
End: 2019-12-20
Payer: MEDICARE

## 2019-12-24 ENCOUNTER — HOSPITAL ENCOUNTER (OUTPATIENT)
Dept: PHYSICAL THERAPY | Age: 79
Discharge: HOME | End: 2019-12-24
Payer: MEDICARE

## 2019-12-24 PROCEDURE — 97016 VASOPNEUMATIC DEVICE THERAPY: CPT

## 2019-12-24 PROCEDURE — 97140 MANUAL THERAPY 1/> REGIONS: CPT

## 2019-12-24 PROCEDURE — 97116 GAIT TRAINING THERAPY: CPT

## 2019-12-24 PROCEDURE — 97110 THERAPEUTIC EXERCISES: CPT

## 2019-12-24 NOTE — PROGRESS NOTES
PT DAILY TREATMENT NOTE     Patient Name: Deeann Riedel  Date:2019  : 1940  [x]  Patient  Verified  Payor: Jean-Claude Amos / Plan: Earnest Wilson / Product Type: Managed Care Medicare /    In time: 11:30 am     Out time: 12:24 pm  Total Treatment Time (min): 54  Total Timed Codes (min): 44  1:1 Treatment Time (min): 38  Visit #: 3 of     Treatment Area: Pain in right knee [M25.561]  Other acute postprocedural pain [G89.18]  Pain, neck [M54.2]    SUBJECTIVE  Pain Level (0-10 scale): 5  Any medication changes, allergies to medications, adverse drug reactions, diagnosis change, or new procedure performed?: [x] No    [] Yes (see summary sheet for update)  Subjective functional status/changes:   [] No changes reported  \"I am just so swollen today. \"    OBJECTIVE  Modality rationale: decrease edema, decrease inflammation and decrease pain to improve the patients ability to improve activity tolerance for completion of ADL's   Min Type Additional Details    [] Estim: []Att   []Unatt  []TENS instruct                 []IFC  []Premod []NMES                       []Other:  []w/US   []w/ice   []w/heat  Position:  Location:    []  Traction: [] Cervical       []Lumbar                       [] Prone          []Supine                       []Intermittent   []Continuous Lbs:  [] before manual  [] after manual    []  Ultrasound: []Continuous   [] Pulsed                           []1MHz   []3MHz Location:  W/cm2:    []  Iontophoresis with dexamethasone         Location: [] Take home patch   [] In clinic    []  Ice     []  heat  []  Ice massage Position:   Location:    10 [x]  Vasopneumatic Device with LEs elevated on mat Pressure: [x] lo [] med [] hi   Temp: [x] lo [] med [] hi   [x] Skin assessment post-treatment:  [x]intact []redness- no adverse reaction       []redness  adverse reaction:       23 min Therapeutic Exercise:  [x] See flow sheet:    Rationale: increase ROM and increase strength to improve the patients ability to perform functional LE tasks    12 min Manual Therapy:  ant/post tibial mobs for increased jt mobility f/b right knee PROM knee flexion/ext    Rationale: decrease pain, increase ROM and increase tissue extensibility to improve gait mechanics and stair navigation      9 min Gait Trainin feet with SPC device on level surfaces with SBA and VCs for inc hip flexion and knee flexion for initial swing, TKE for proper heel strike, inc ankle PF for proper toe off   Rationale: increase proprioception, improve balance strategies, increase strength to improve the patient's ability to ambulate with reduced fall risk          X min Patient Education: [x] Review HEP from IE; discussed use of normalized gait with SPC to address edema in addition to RICE     Other Objective/Functional Measures:     Pain Level (0-10 scale) post treatment: 3    ASSESSMENT/Changes in Function:   Improved gait quality with use of SPC; discussed WB'ing mechanics during stance and effect of mm control/ankle AROM to reduce LE edema as patient notes icing frequency. Patient will continue to benefit from skilled PT services to modify and progress therapeutic interventions, address functional mobility deficits, address ROM deficits, address strength deficits, analyze and address soft tissue restrictions, analyze and cue movement patterns, analyze and modify body mechanics/ergonomics, assess and modify postural abnormalities, address imbalance/dizziness and instruct in home and community integration to attain remaining goals. [x]  See Plan of Care  []  See progress note/recertification  []  See Discharge Summary         Progress towards goals / Updated goals: · Short Term Goals: To be accomplished in  1  weeks:  1) Pt will be independent and compliant with HEP -Goal met; pt reports compliance with HEP 19  · Long Term Goals:  To be accomplished in  4-6  weeks:  1) Pt will score >54/100 on FOTO to show significant improvement in functional mobility and QOL  2) Pt will display >120 knee flexion AROM for improved ease of dressing ADL's  3) Pt will display >4/5 strength in R hip for ease of stair navigation  4) Pt will display full TKE on R LE for improved gait mechanics.  -Good progress with improving TKE during GT (12/24/19)    PLAN  [x]  Upgrade activities as tolerated     [x]  Continue plan of care  []  Update interventions per flow sheet       []  Discharge due to:_  []  Other:_     Kris Rush, MARGO 12/24/2019

## 2019-12-27 ENCOUNTER — HOSPITAL ENCOUNTER (OUTPATIENT)
Dept: PHYSICAL THERAPY | Age: 79
Discharge: HOME | End: 2019-12-27
Payer: MEDICARE

## 2019-12-27 PROCEDURE — 97140 MANUAL THERAPY 1/> REGIONS: CPT

## 2019-12-27 PROCEDURE — 97110 THERAPEUTIC EXERCISES: CPT

## 2019-12-27 PROCEDURE — 97016 VASOPNEUMATIC DEVICE THERAPY: CPT

## 2019-12-27 NOTE — PROGRESS NOTES
PT DAILY TREATMENT NOTE     Patient Name: Kiarra Adjutant  Date:2019  : 1940  [x]  Patient  Verified  Payor: Shannan Zarate / Plan: [employer] / Product Type: Managed Care Medicare /    In time: 2:00 pm        Out time: 3:04 pm  Total Treatment Time (min): 64  Total Timed Codes (min): 54  1:1 Treatment Time (min): 2:10pm-2:50pm (40)  Visit #: 4 of     Treatment Area: Pain in right knee [M25.561]  Other acute postprocedural pain [G89.18]  Pain, neck [M54.2]    SUBJECTIVE  Pain Level (0-10 scale): 4  Any medication changes, allergies to medications, adverse drug reactions, diagnosis change, or new procedure performed?: [x] No    [] Yes (see summary sheet for update)  Subjective functional status/changes:   [] No changes reported  \"It mainly hurts at that one spot (pt points to front of right leg). \"    OBJECTIVE  Modality rationale: decrease edema, decrease inflammation and decrease pain to improve the patients ability to improve activity tolerance for completion of ADL's   Min Type Additional Details    [] Estim: []Att   []Unatt  []TENS instruct                 []IFC  []Premod []NMES                       []Other:  []w/US   []w/ice   []w/heat  Position:  Location:    []  Traction: [] Cervical       []Lumbar                       [] Prone          []Supine                       []Intermittent   []Continuous Lbs:  [] before manual  [] after manual    []  Ultrasound: []Continuous   [] Pulsed                           []1MHz   []3MHz Location:  W/cm2:    []  Iontophoresis with dexamethasone         Location: [] Take home patch   [] In clinic    []  Ice     []  heat  []  Ice massage Position:   Location:    10 [x]  Vasopneumatic Device with LEs elevated on mat Pressure: [x] lo [] med [] hi   Temp: [x] lo [] med [] hi   [x] Skin assessment post-treatment:  [x]intact []redness- no adverse reaction       []redness  adverse reaction:       43 min Therapeutic Exercise:  [x] See flow sheet: added standing HR/TR   Rationale: increase ROM and increase strength to improve the patients ability to perform functional LE tasks    11 min Manual Therapy:  (R) knee retro massage; ant/post tibial mobs for increased jt mobility f/b right knee PROM knee flexion/ext    Rationale: decrease pain, increase ROM and increase tissue extensibility to improve gait mechanics and stair navigation      0 min Gait Training:  NT   Rationale: increase proprioception, improve balance strategies, increase strength to improve the patient's ability to ambulate with reduced fall risk          X min Patient Education: [x] Review HEP     Other Objective/Functional Measures:   (R) knee AROM flexion: 110 deg  Cont VASO sec pain relief following last session to address LE edema (pitting). Pain Level (0-10 scale) post treatment: 2    ASSESSMENT/Changes in Function:   Patient with (+) response to retro massage to decrease pain and improve jt mobility. Limited ankle DF AROM noted with toe raises. There is minimal extensor lag with SLR. Patient will continue to benefit from skilled PT services to modify and progress therapeutic interventions, address functional mobility deficits, address ROM deficits, address strength deficits, analyze and address soft tissue restrictions, analyze and cue movement patterns, analyze and modify body mechanics/ergonomics, assess and modify postural abnormalities, address imbalance/dizziness and instruct in home and community integration to attain remaining goals. [x]  See Plan of Care  []  See progress note/recertification  []  See Discharge Summary         Progress towards goals / Updated goals: · Short Term Goals: To be accomplished in  1  weeks:  1) Pt will be independent and compliant with HEP -Goal met; pt reports compliance with HEP 12/18/19  · Long Term Goals:  To be accomplished in  4-6  weeks:  1) Pt will score >54/100 on FOTO to show significant improvement in functional mobility and QOL  2) Pt will display >120 knee flexion AROM for improved ease of dressing ADL's  3) Pt will display >4/5 strength in R hip for ease of stair navigation  4) Pt will display full TKE on R LE for improved gait mechanics.  -Minimal extensor lag with SLR (12/27/19)    PLAN  [x]  Upgrade activities as tolerated     [x]  Continue plan of care  []  Update interventions per flow sheet       []  Discharge due to:_  []  Other:_     Dandre Hagen, MARGO 12/27/2019

## 2019-12-30 ENCOUNTER — HOSPITAL ENCOUNTER (OUTPATIENT)
Dept: PHYSICAL THERAPY | Age: 79
Discharge: HOME | End: 2019-12-30
Payer: MEDICARE

## 2019-12-30 PROCEDURE — 97016 VASOPNEUMATIC DEVICE THERAPY: CPT

## 2019-12-30 PROCEDURE — 97140 MANUAL THERAPY 1/> REGIONS: CPT

## 2019-12-30 PROCEDURE — 97116 GAIT TRAINING THERAPY: CPT

## 2019-12-30 NOTE — PROGRESS NOTES
PT DAILY TREATMENT NOTE     Patient Name: Paige Chan  Date:2019  : 1940  [x]  Patient  Verified  Payor: Miguel Morning / Plan: [facility] / Product Type: Managed Care Medicare /    In time: 3:30 pm         Out time: 4:35 pm  Total Treatment Time (min): 65  Total Timed Codes (min): 55  1:1 Treatment Time (min): 3:38pm-4:10pm (32)  Visit #: 5 of     Treatment Area: Pain in right knee [M25.561]  Other acute postprocedural pain [G89.18]  Pain, neck [M54.2]    SUBJECTIVE  Pain Level (0-10 scale): 5  Any medication changes, allergies to medications, adverse drug reactions, diagnosis change, or new procedure performed?: [x] No    [] Yes (see summary sheet for update)  Subjective functional status/changes:   [] No changes reported  Patient states she is getting more and more comfortable using her SPC to amb longer distances.      OBJECTIVE  Modality rationale: decrease edema, decrease inflammation and decrease pain to improve the patients ability to improve activity tolerance for completion of ADL's   Min Type Additional Details    [] Estim: []Att   []Unatt  []TENS instruct                 []IFC  []Premod []NMES                       []Other:  []w/US   []w/ice   []w/heat  Position:  Location:    []  Traction: [] Cervical       []Lumbar                       [] Prone          []Supine                       []Intermittent   []Continuous Lbs:  [] before manual  [] after manual    []  Ultrasound: []Continuous   [] Pulsed                           []1MHz   []3MHz Location:  W/cm2:    []  Iontophoresis with dexamethasone         Location: [] Take home patch   [] In clinic    []  Ice     []  heat  []  Ice massage Position:   Location:    10 [x]  Vasopneumatic Device with LEs elevated on mat Pressure: [x] lo [] med [] hi   Temp: [x] lo [] med [] hi   [x] Skin assessment post-treatment:  [x]intact []redness- no adverse reaction       []redness  adverse reaction:       39 min Therapeutic Exercise:  [x] See flow sheet: added standing TKE   Rationale: increase ROM and increase strength to improve the patients ability to perform functional LE tasks    8 min Manual Therapy:  (R) knee retro massage; ant/post tibial mobs for increased jt mobility f/b right knee PROM knee flexion/ext    Rationale: decrease pain, increase ROM and increase tissue extensibility to improve gait mechanics and stair navigation      8 min Gait Training: pre-gait; 4 x 50 feet using SPC req VCs for knee flexion to TKE during midstance to normalize gait   Rationale: increase proprioception, improve balance strategies, increase strength to improve the patient's ability to ambulate with reduced fall risk          X min Patient Education: [x] Review HEP     Other Objective/Functional Measures:   (R) knee AROM flexion, upon arrival: 110 deg    Pain Level (0-10 scale) post treatment: 0    ASSESSMENT/Changes in Function:   Patient demos improved AD management and heel-toe gait. Good response to gait training to improve quad loading during midstance to normalize gait. Patient will continue to benefit from skilled PT services to modify and progress therapeutic interventions, address functional mobility deficits, address ROM deficits, address strength deficits, analyze and address soft tissue restrictions, analyze and cue movement patterns, analyze and modify body mechanics/ergonomics, assess and modify postural abnormalities, address imbalance/dizziness and instruct in home and community integration to attain remaining goals. [x]  See Plan of Care  []  See progress note/recertification  []  See Discharge Summary         Progress towards goals / Updated goals: · Short Term Goals: To be accomplished in  1  weeks:  1) Pt will be independent and compliant with HEP -Goal met; pt reports compliance with HEP 12/18/19  · Long Term Goals:  To be accomplished in  4-6  weeks:  1) Pt will score >54/100 on FOTO to show significant improvement in functional mobility and QOL  2) Pt will display >120 knee flexion AROM for improved ease of dressing ADL's. -Goal progressing; pt demos 110 deg knee flexion AROM pre-MT (12/30/19)  3) Pt will display >4/5 strength in R hip for ease of stair navigation  4) Pt will display full TKE on R LE for improved gait mechanics.  -Minimal extensor lag with SLR (12/27/19)    PLAN  [x]  Upgrade activities as tolerated     [x]  Continue plan of care  []  Update interventions per flow sheet       []  Discharge due to:_  [x]  Other: progress HEP to include TKE; add minisquats with TB to makenzie Gonzalez, PTA 12/30/2019

## 2020-01-02 ENCOUNTER — APPOINTMENT (OUTPATIENT)
Dept: PHYSICAL THERAPY | Age: 80
End: 2020-01-02
Payer: MEDICARE

## 2020-01-03 ENCOUNTER — HOSPITAL ENCOUNTER (OUTPATIENT)
Dept: PHYSICAL THERAPY | Age: 80
Discharge: HOME | End: 2020-01-03
Payer: MEDICARE

## 2020-01-03 PROCEDURE — 97016 VASOPNEUMATIC DEVICE THERAPY: CPT

## 2020-01-03 PROCEDURE — 97110 THERAPEUTIC EXERCISES: CPT

## 2020-01-03 PROCEDURE — 97116 GAIT TRAINING THERAPY: CPT

## 2020-01-03 PROCEDURE — 97140 MANUAL THERAPY 1/> REGIONS: CPT

## 2020-01-03 NOTE — PROGRESS NOTES
PT DAILY TREATMENT NOTE     Patient Name: Katlyn List  Date:1/3/2020  : 1940  [x]  Patient  Verified  Payor: Angel Hutton / Plan: [employer]  / Product Type: Managed Care Medicare /    In time: 11:00 am       Out time: 12:00 pm  Total Treatment Time (min): 60  Total Timed Codes (min): 50  1:1 Treatment Time (min): 11:00am-11:45am (45)  Visit #: 6 of     Treatment Area: Pain in right knee [M25.561]  Other acute postprocedural pain [G89.18]  Pain, neck [M54.2]    SUBJECTIVE  Pain Level (0-10 scale): 0  Any medication changes, allergies to medications, adverse drug reactions, diagnosis change, or new procedure performed?: [x] No    [] Yes (see summary sheet for update)  Subjective functional status/changes:   [] No changes reported  Patient states she has been icing her knee regularly 2x daily with significant relief.     OBJECTIVE  Modality rationale: decrease edema, decrease inflammation and decrease pain to improve the patients ability to improve activity tolerance for completion of ADL's   Min Type Additional Details    [] Estim: []Att   []Unatt  []TENS instruct                 []IFC  []Premod []NMES                       []Other:  []w/US   []w/ice   []w/heat  Position:  Location:    []  Traction: [] Cervical       []Lumbar                       [] Prone          []Supine                       []Intermittent   []Continuous Lbs:  [] before manual  [] after manual    []  Ultrasound: []Continuous   [] Pulsed                           []1MHz   []3MHz Location:  W/cm2:    []  Iontophoresis with dexamethasone         Location: [] Take home patch   [] In clinic    []  Ice     []  heat  []  Ice massage Position:   Location:    10 [x]  Vasopneumatic Device with LEs elevated on mat Pressure: [] lo [x] med [] hi   Temp: [x] lo [] med [] hi   [x] Skin assessment post-treatment:  [x]intact []redness- no adverse reaction       []redness  adverse reaction:       32 min Therapeutic Exercise:  [x] See flow sheet: added standing ihip x 3   Rationale: increase ROM and increase strength to improve the patients ability to perform functional LE tasks    10 min Manual Therapy:  (R) knee retro massage; ant/post tibial mobs for increased jt mobility f/b right knee PROM knee flexion/ext    Rationale: decrease pain, increase ROM and increase tissue extensibility to improve gait mechanics and stair navigation      8 min Gait Training: pre-gait; 2.5 laps around clinic using SPC req VCs for knee flexion to TKE during midstance to normalize gait   Rationale: increase proprioception, improve balance strategies, increase strength to improve the patient's ability to ambulate with reduced fall risk          X min Patient Education: [x] Review HEP     Other Objective/Functional Measures:       Pain Level (0-10 scale) post treatment: 0    ASSESSMENT/Changes in Function:   Improved gait quality post-tx. Patient will continue to benefit from skilled PT services to modify and progress therapeutic interventions, address functional mobility deficits, address ROM deficits, address strength deficits, analyze and address soft tissue restrictions, analyze and cue movement patterns, analyze and modify body mechanics/ergonomics, assess and modify postural abnormalities, address imbalance/dizziness and instruct in home and community integration to attain remaining goals. [x]  See Plan of Care  []  See progress note/recertification  []  See Discharge Summary         Progress towards goals / Updated goals: · Short Term Goals: To be accomplished in  1  weeks:  1) Pt will be independent and compliant with HEP -Goal met; pt reports compliance with HEP 12/18/19  · Long Term Goals:  To be accomplished in  4-6  weeks:  1) Pt will score >54/100 on FOTO to show significant improvement in functional mobility and QOL  2) Pt will display >120 knee flexion AROM for improved ease of dressing ADL's. -Goal progressing; pt demos 110 deg knee flexion AROM pre-MT (12/30/19)  3) Pt will display >4/5 strength in R hip for ease of stair navigation  4) Pt will display full TKE on R LE for improved gait mechanics.  -Minimal extensor lag with SLR (12/27/19)    PLAN  [x]  Upgrade activities as tolerated     [x]  Continue plan of care  []  Update interventions per flow sheet       []  Discharge due to:_  [x]  Other: progress HEP to include TKE; add minisquats with TB to makenzie Vick, PTA 1/3/2020

## 2020-01-06 ENCOUNTER — APPOINTMENT (OUTPATIENT)
Dept: PHYSICAL THERAPY | Age: 80
End: 2020-01-06
Payer: MEDICARE

## 2020-01-08 ENCOUNTER — HOSPITAL ENCOUNTER (OUTPATIENT)
Dept: PHYSICAL THERAPY | Age: 80
Discharge: HOME | End: 2020-01-08
Payer: MEDICARE

## 2020-01-08 PROCEDURE — 97110 THERAPEUTIC EXERCISES: CPT | Performed by: GENERAL ACUTE CARE HOSPITAL

## 2020-01-08 NOTE — PROGRESS NOTES
PT DAILY TREATMENT NOTE     Patient Name: Larry Bowling  Date:2020  : 1940  [x]  Patient  Verified  Payor: Antoinette Chawla / Plan: [employer] / Product Type: Managed Care Medicare /    In time: 11:34 am        Out time: 12:40 pm  Total Treatment Time (min): 66  Total Timed Codes (min): 66  1:1 Treatment Time (min):  11:39-12:40 (61)  Visit #: 7 of     Treatment Area: Pain in right knee [M25.561]  Other acute postprocedural pain [G89.18]  Pain, neck [M54.2]    SUBJECTIVE  Pain Level (0-10 scale): 5/10  Any medication changes, allergies to medications, adverse drug reactions, diagnosis change, or new procedure performed?: [x] No    [] Yes (see summary sheet for update)  Subjective functional status/changes:   [] No changes reported  Patient states her R knee is doing much better, but is concerned about her neck strength. FU with MD on Friday.      OBJECTIVE  Modality rationale: decrease edema, decrease inflammation and decrease pain to improve the patients ability to improve activity tolerance for completion of ADL's   Min Type Additional Details    [] Estim: []Att   []Unatt  []TENS instruct                 []IFC  []Premod []NMES                       []Other:  []w/US   []w/ice   []w/heat  Position:  Location:    []  Traction: [] Cervical       []Lumbar                       [] Prone          []Supine                       []Intermittent   []Continuous Lbs:  [] before manual  [] after manual    []  Ultrasound: []Continuous   [] Pulsed                           []1MHz   []3MHz Location:  W/cm2:    []  Iontophoresis with dexamethasone         Location: [] Take home patch   [] In clinic    []  Ice     []  heat  []  Ice massage Position:   Location:    PD [x]  Vasopneumatic Device with LEs elevated on mat Pressure: [x] lo [] med [] hi   Temp: [x] lo [] med [] hi   [x] Skin assessment post-treatment:  [x]intact []redness- no adverse reaction       []redness  adverse reaction:       61 min Therapeutic Exercise:  [x] See flow sheet: including re-assessment of R knee and cervical spine. Completed FOTO with patient. Rationale: increase ROM and increase strength to improve the patients ability to perform functional LE tasks            X min Patient Education: [x] Review HEP     Other Objective/Functional Measures:   FOTO : 75/100  Subjective functional improvement: 75%  Functional improvements: range of motion, reduced swelling, standing tolerance, gait pattern, strength  Functional deficits: walking long distance, getting in/out of chairs without arm rests   Pain: 2/10 on avg   (R) knee AROM flexion: 0-110 deg  Strength: R Hip flex 4- /5,  3+/5 ext,  4-/5 abd    R Knee flex  4+/5,  5/5 ext   SLR (-) quad lag   Gait: mild antalgia, reduced knee flexion during swing, reduced R heel strike   Stairs: reciprocal pattern with light use of B rails    Cervical assessment:   Chief complaint: unable to hold head up in neutral position for extended periods   SERGIO: Admits to h/o lot potassium which began head drop issues about 1-1.5 years ago   No radicular symptoms present  Cervical ROM: Flexion 35,  Ext 20  R SB 12,   L SB 20  L rot 50, R rot 45   Poor extensor strength with frequent neck flexion  +ttp B UT (mild), (-) ttp c/s paraspinals, SOR, scalenes     Pain Level (0-10 scale) post treatment: 0/10    ASSESSMENT/Changes in Function:   See PN. Plan to address cervical impairments in upcoming sessions. **Please note that patient will not be able to return to PT until 2/3/20 due to financial reasons related to copay.      Patient will continue to benefit from skilled PT services to modify and progress therapeutic interventions, address functional mobility deficits, address ROM deficits, address strength deficits, analyze and address soft tissue restrictions, analyze and cue movement patterns, analyze and modify body mechanics/ergonomics, assess and modify postural abnormalities, address imbalance/dizziness and instruct in home and community integration to attain remaining goals. [x]  See Plan of Care  []  See progress note/recertification  []  See Discharge Summary         Progress towards goals / Updated goals: · Short Term Goals: To be accomplished in  1  weeks:  1) Pt will be independent and compliant with HEP -Goal met; pt reports compliance with HEP 12/18/19  · Long Term Goals: To be accomplished in  4-6  weeks:  1) Pt will score >54/100 on FOTO to show significant improvement in functional mobility and QOL -Met; 75/100 on FOTO 1/8/20  2) Pt will display >120 knee flexion AROM for improved ease of dressing ADL's. -Goal progressing; pt demos 110 deg knee flexion AROM  (1/8/20)  3) Pt will display >4/5 strength in R hip for ease of stair navigation -Goal progressing; Hip flex 4- /5,  3+/5 ext,  4-/5 abd (1/8/20)  4) Pt will display full TKE on R LE for improved gait mechanics. -Goal met; 1/8/20    PLAN  [x]  Upgrade activities as tolerated     [x]  Continue plan of care  []  Update interventions per flow sheet       []  Discharge due to:_  [x]  Other: See PN. Plan to initiate treatment to c/s.     Sam Ang, PT 1/8/2020

## 2020-01-10 ENCOUNTER — APPOINTMENT (OUTPATIENT)
Dept: PHYSICAL THERAPY | Age: 80
End: 2020-01-10
Payer: MEDICARE

## 2020-02-03 ENCOUNTER — HOSPITAL ENCOUNTER (OUTPATIENT)
Dept: PHYSICAL THERAPY | Age: 80
Discharge: HOME | End: 2020-02-03
Payer: MEDICARE

## 2020-02-03 PROCEDURE — 97140 MANUAL THERAPY 1/> REGIONS: CPT

## 2020-02-03 PROCEDURE — 97110 THERAPEUTIC EXERCISES: CPT

## 2020-02-03 NOTE — PROGRESS NOTES
PT DAILY TREATMENT NOTE     Patient Name: Ashlee Wisdom  Date:2/3/2020  : 1940  [x]  Patient  Verified  Payor: Mac Turcios / Plan: Lucy Sickle / Product Type: Managed Care Medicare /    In time: 10:38  am        Out time: 11:36 am  Total Treatment Time (min): 58  Total Timed Codes (min): 48  1:1 Treatment Time (min):  10:38am-11:20am (42)  Visit #: 1 of     Treatment Area: Pain in right knee [M25.561]  Other acute postprocedural pain [G89.18]  Pain, neck [M54.2]    SUBJECTIVE  Pain Level (0-10 scale): 2 in (R) knee; 0 in C/S  Any medication changes, allergies to medications, adverse drug reactions, diagnosis change, or new procedure performed?: [x] No    [] Yes (see summary sheet for update)  Subjective functional status/changes:   [] No changes reported  Patient continues to report significant improvements in right knee pain, mobility, and strength. She states she feels her knee is getting back to normal. Patient complains mainly of the inability/pain with holding her head up (as in normal positioning) for long periods of time. Patient states consistent HEP compliance.     OBJECTIVE  Modality rationale: decrease pain and increase tissue extensibility to improve the patients ability to improve activity tolerance for completion of ADL's   Min Type Additional Details    [] Estim: []Att   []Unatt  []TENS instruct                 []IFC  []Premod []NMES                       []Other:  []w/US   []w/ice   []w/heat  Position:  Location:    []  Traction: [] Cervical       []Lumbar                       [] Prone          []Supine                       []Intermittent   []Continuous Lbs:  [] before manual  [] after manual    []  Ultrasound: []Continuous   [] Pulsed                           []1MHz   []3MHz Location:  W/cm2:    []  Iontophoresis with dexamethasone         Location: [] Take home patch   [] In clinic   10 []  Ice     [x]  heat  []  Ice massage Position: reclined  Location: C/S and T/S   NT []  Vasopneumatic Device Pressure: [] lo [] med [] hi   Temp: [] lo [] med [] hi   [x] Skin assessment post-treatment:  [x]intact []redness- no adverse reaction       []redness  adverse reaction:       24 min Therapeutic Exercise:  [x] See flow sheet: focus on C/S therex, see flow sheet    Rationale: increase ROM and increase strength to improve the patients ability to perform functional UE tasks    24 min Manual Therapy: C/S PROM rotation and SB bilaterally in semi-reclined position; sitting PA mobs to lower cervical and upper thoracic with patient performing repetitive C/S extension (mobilization with movement)   Rationale: decrease pain, increase ROM and increase tissue extensibility to improve the patients ability to maintain neutral C/S alignment for ADLs, conversation, cooking          X min Patient Education: [x] Review HEP; progressed scapular strengthening with TB (rows, extensions, and W's); added thoracic extensions in sitting using rolled towel     Other Objective/Functional Measures:   C/S AROM, upon arrival:   SB (R) 15 deg (L) 35 deg,   Rotation (R) 45 deg (L) 38 deg,   Flexion: WFL  Extension: 12 deg    C/S AROM, post-therex and manual:  SB (R) 20 deg (L) 35 deg  Rotation: (R) 70 deg, (L) 55 deg  Extension: 27 deg    Pain Level (0-10 scale) post treatment: 0 in right knee and C/S    ASSESSMENT/Changes in Function:   Patient with s/s consistent with postural syndrome as (-) for C/S special testing. Patient was fatigued quickly with progressed scapular strengthening and req cueing to walk closer to fulcrum of TB to reduce resistance and decrease UT compensations. Limited joint play at the cervicothoracic junction. **Please note that patient will not be able to return to PT until 2/17/20 due to financial reasons related to copay.       Patient will continue to benefit from skilled PT services to modify and progress therapeutic interventions, address functional mobility deficits, address ROM deficits, address strength deficits, analyze and address soft tissue restrictions, analyze and cue movement patterns, analyze and modify body mechanics/ergonomics, assess and modify postural abnormalities, address imbalance/dizziness and instruct in home and community integration to attain remaining goals. [x]  See Plan of Care  []  See progress note/recertification  []  See Discharge Summary         Progress towards goals / Updated goals:  1. Pt will display >120 knee flexion AROM for improved ease of dressing ADL's.   2.  Pt will display >4/5 strength in R hip for ease of stair navigation   3. Pt will display 30 degrees of cervical extension for washing hair in shower. -Goal progressing; improve to 27 deg C/S extension post-tx (2/3/20)   4.   Pt will maintain upright head posture during session with only Min VC's for correction      PLAN  [x]  Upgrade activities as tolerated     [x]  Continue plan of care  []  Update interventions per flow sheet       []  Discharge due to:_  [x]  Other: reassess goals NV as patient holding from consistent PT treatments due to high copay    Bobby Chand, PTA 2/3/2020

## 2020-02-17 ENCOUNTER — HOSPITAL ENCOUNTER (OUTPATIENT)
Dept: PHYSICAL THERAPY | Age: 80
Discharge: HOME | End: 2020-02-17
Payer: MEDICARE

## 2020-02-17 PROCEDURE — 97110 THERAPEUTIC EXERCISES: CPT

## 2020-02-17 PROCEDURE — 97140 MANUAL THERAPY 1/> REGIONS: CPT

## 2020-02-17 NOTE — PROGRESS NOTES
PT DAILY TREATMENT NOTE     Patient Name: Sachi Castro  Date:2020  : 1940  [x]  Patient  Verified  Payor: Lisette Lopez / Plan: Remi Copier / Product Type: Managed Care Medicare /    In time:10:31  Out time:11:27  Total Treatment Time (min): 56  Total Timed Codes (min): 51  1:1 Treatment Time (min): 51   Visit #: 2 of     Treatment Area: Pain in right knee [M25.561]  Other acute postprocedural pain [G89.18]  Pain, neck [M54.2]    SUBJECTIVE  Pain Level (0-10 scale): 4  Any medication changes, allergies to medications, adverse drug reactions, diagnosis change, or new procedure performed?: [x] No    [] Yes (see summary sheet for update)  Subjective functional status/changes:   [] No changes reported  Just knee pain today.      OBJECTIVE  Modality rationale: decrease pain and increase tissue extensibility to improve the patients ability to ambulate, stairs    Min Type Additional Details   TC []  Ice     []  heat  []  Ice massage Position:  Location:   [x] Skin assessment post-treatment:  [x]intact []redness- no adverse reaction       []redness  adverse reaction:       41 (36) min Therapeutic Exercise:  [x] See flow sheet : added step ups, leg press; progressed hip 3 way, HR /TR on foam    Rationale: increase ROM, increase strength and improve coordination to improve the patients ability to walk, stairs    15 min Manual Therapy:   C/S PROM rotation and SB bilaterally in semi-reclined position; sitting PA mobs to lower cervical and upper thoracic with patient performing repetitive C/S extension (mobilization with movement); PROM R knee flexion mobs    Rationale: decrease pain, increase ROM and increase tissue extensibility to improve posture for ADls, balance, mobility           x min Patient Education: [x] Review HEP    [x] Progressed/Changed HEP based on: C/S    [] positioning   [] body mechanics   [] transfers   [] heat/ice application        Other Objective/Functional Measures:   FOTO : to be performed NV due to time constraints  Subjective functional improvement: 85% (knee); 75% (neck)   Functional improvements: walking 1 mile: range of motion, reduced swelling, standing tolerance, gait pattern, strength  Functional deficits: trusting herself walking outside, getting comfortable at night  Pain: knees 0-5/10 (weather related stiffness); 0/10 on avg  (R) knee AROM flexion: 0-110 deg  Strength: R Hip flex 4- /5,  4/5 ext,  4-/5 abd                R Knee flex  4+/5,  5/5 ext   SLR (-) quad lag   Gait: mild antalgia, reduced knee flexion during swing, reduced R heel strike   Stairs: reciprocal pattern with light use of B rails  Squat: functional patterning  Sit to stand: min use of UE's, WNL patterning and no pain      Cervical assessment:    Chief complaint: unable to hold head up in neutral position for extended periods; L shoulder pain and weakness  Functional deficits: sitting erect; C/S extension, L shoulder pain and weakness   SERGIO: Admits to h/o low potassium which began head drop issues about 1-1.5 years ago   No radicular symptoms present  Pain ranges: 0 to 5/10 (worst with sitting upright, better with poor posture)  Cervical ROM: Flexion 45,  Ext 36 (with retraction)    R SB 20,   L SB 25   L rot 50, R rot 55  C/S retractioN: retracts to 5 from neutral    Poor extensor strength with frequent neck flexion  (-) UNNT  (-) spurlings   +ttp B UT (mild), (-) ttp c/s paraspinals, SOR, scalenes     Pain Level (0-10 scale) post treatment: 2 knee     ASSESSMENT/Changes in Function: see  reassessment     Patient will continue to benefit from skilled PT services to modify and progress therapeutic interventions, address functional mobility deficits, address ROM deficits, address strength deficits, analyze and address soft tissue restrictions, analyze and cue movement patterns, analyze and modify body mechanics/ergonomics, assess and modify postural abnormalities, address imbalance/dizziness and instruct in home and community integration to attain remaining goals. []  See Plan of Care  []  See progress note/recertification  []  See Discharge Summary         Progress towards goals / Updated goals:  1.  Pt will display >120 knee flexion AROM for improved ease of dressing ADL's. Goal slowly progressing at 110 flexion after mobs (2/17/2020   2.  Pt will display >4/5 strength in R hip for ease of stair navigation Goal progressing with increased hip ext strength to 4/5 (2/17/2020)   3.  Pt will display 30 degrees of cervical extension for washing hair in shower. -Goal met with retraction extension to 36 deg (2/17/2020)   4.  Pt will maintain upright head posture during session with only Min VC's for correction Pt only requires 2 VC throughout whole session (2/17/2020)       PLAN  [x]  Upgrade activities as tolerated     [x]  Continue plan of care  []  Update interventions per flow sheet        []  Discharge due to:_  [x]  Other:TC, please perform FOTO NV.  Con't approx 1x/month due to financial constraints. _      Jorje Mcwilliams, PT 2/17/2020  8:17 AM    Future Appointments   Date Time Provider Milagros Colbert   2/17/2020 10:30 AM Belkis Quesada, PT [facility]

## 2020-02-17 NOTE — PROGRESS NOTES
[address] [facility] [address] [facility] AT [address] [place], [place] [address]  Phone: [phone] Fax: [phone] OF [address]          Patient Name: Sachi Castro : 1940   Treatment/Medical Diagnosis: Pain in right knee [M25.561]  Other acute postprocedural pain [G89.18]  Pain, neck [M54.2]   Onset Date: R TKA revision 10/25/2019; Neck weakness 1.5 years ago     Referral Source: Deanna Mead MD [facility]): 2019   Prior Hospitalization: See Medical History Provider #: [id number]   Prior Level of Function: Mod I using SPC for all mobility. Retired. Lives alone    Comorbidities: HTN, arthritis, L TKA    Medications: Verified on Patient Summary List   Visits from [facility]: 9 Missed Visits: 1     Goal/Measure of Progress Goal Met? 1. Pt will display >120 knee flexion AROM for improved ease of dressing ADL's. Status at last Eval: 0 to 107 Current Status: 0 to 110  progressing   2. Pt will display >4/5 strength in R hip for ease of stair navigation   Status at last Eval: Flex 4/5, ext 4-/5, abd 3+/5 Current Status: Flexion 4/5, ext 4/5, abd 4-/5  Progressing    3. Pt will display 30 degrees of cervical extension for washing hair in shower   Status at last Eval: painful Current Status: 36 retraction extension  yes   4.   Pt will maintain upright head posture during session with only Min VC's for correction   Status at last Eval: unable Current Status: Requires 2 vC Progressing      Key Functional Changes/Progress:  FOTO : to be performed NV due to time constraints  Subjective functional improvement: 85% (knee); 75% (neck)   Functional improvements: walking 1 mile: range of motion, reduced swelling, standing tolerance, gait pattern, strength  Functional deficits: trusting herself walking outside, getting comfortable at night  Pain: knees 0-5/10 (weather related stiffness); 0/10 on avg  (R) knee AROM flexion: 0-110 deg  Strength: R Hip flex 4- /5,  4/5 ext,  4-/5 abd                R Knee flex  4+/5,  5/5 ext   SLR (-) quad lag   Gait: mild antalgia, reduced knee flexion during swing, reduced R heel strike   Stairs: reciprocal pattern with light use of B rails  Squat: functional patterning  Sit to stand: min use of UE's, WNL patterning and no pain      Cervical assessment:    Chief complaint: unable to hold head up in neutral position for extended periods; L shoulder pain and weakness  Functional deficits: sitting erect; C/S extension, L shoulder pain and weakness   SERGIO: Admits to h/o low potassium which began head drop issues about 1-1.5 years ago   No radicular symptoms present  Pain ranges: 0 to 5/10 (worst with sitting upright, better with poor posture)  Cervical ROM: Flexion 45,  Ext 22    R SB 20,   L SB 25   L rot 50, R rot 55  C/S retractioN: retracts to 5 from neutral    Poor extensor strength with frequent neck flexion  (-) UNNT  (-) spurlings   +ttp B UT (mild), (-) ttp c/s paraspinals, SOR, scalenes     Problem List: pain affecting function, decrease ROM, decrease strength, impaired gait/ balance, decrease ADL/ functional abilitiies, decrease activity tolerance, decrease flexibility/ joint mobility and decrease transfer abilities   Treatment Plan may include any combination of the following: Therapeutic exercise, Therapeutic activities, Neuromuscular re-education, Physical agent/modality, Gait/balance training, Manual therapy, Patient education, Self Care training, Functional mobility training, Home safety training and Stair training  Patient Goal(s) has been updated and includes:     Goals for this certification period include and are to be achieved in   4  treatments:  1. Pt will display >120 knee flexion AROM for improved ease of dressing ADL's.   2 Pt will display >4/5 strength in R hip for ease of stair navigation  3.  Pt will maintain upright head posture during session with only Min VC's for correction  Frequency / Duration:   Patient to be seen   1   times per week for   4    treatments:  G-Codes (GP): na  Assessments/Recommendations: Pt to be seen ideally 1x/week for 4 weeks however she is limited by financial constraints with transportation and [address] [place] and may only be seen every 3-4 weeks. If you have any questions/comments please contact us directly at [phone]. Thank you for allowing us to assist in the care of your patient. Therapist Signature: Chris Young DPT Date: 8/44/3972   Certification Period:  Reporting Period: 2/17/2020 to 5/15/2020  12/12/19 to 2/12/2020 Time: 8:22 AM   NOTE TO PHYSICIAN:  PLEASE COMPLETE THE ORDERS BELOW AND FAX TO   [facility]: [phone]  If you are unable to process this request in 24 hours please contact our office: [phone]    ___ I have read the above report and request that my patient continue as recommended.   ___ I have read the above report and request that my patient continue therapy with the following changes/special instructions: ________________________________________________   ___ I have read the above report and request that my patient be discharged from therapy.      Physician Signature:        Date:       Time:

## 2020-03-11 NOTE — PROGRESS NOTES
1906 Assumed care of patient from Simone Diamond RN. Shift assessment initiated. Pain voiced at 6/10, ice on site. Medicated per [facility] during bedside report. All questions and concerns answered. All needs met. IS encouraged. Call bell/phone within reach. No signs of distress noted. Will continue to monitor for changes. 8499 Patient OOB in chair. 0740 Bedside and Verbal shift change report given to Mark [place] RN (oncoming nurse) by Lianna Glasgow RN (offgoing nurse). Report included the following information SBAR, Kardex, MAR, Recent Results and Med Rec Status. no fever/no cough

## 2020-03-23 NOTE — PROGRESS NOTES
[address] [facility] [address] [facility] AT [address] [facility] [address] [place], [place] [address]  Phone: [phone] Fax: [phone] PHYSICAL THERAPY          Patient Name: Chuck Putnam : 1940   Treatment/Medical Diagnosis: Pain in right knee [M25.561]  Other acute postprocedural pain [G89.18]  Pain, neck [M54.2]   Onset Date: R TKA revision 10/25/2019; Neck weakness 1.5 years ago     Referral Source: Duke Almanza MD [facility]): 2019   Prior Hospitalization: See Medical History Provider #: [id number]   Prior Level of Function: Mod I using SPC for all mobility. Retired. Lives alone    Comorbidities: HTN, arthritis, L TKA    Medications: Verified on Patient Summary List   Visits from [facility]: 9 Missed Visits: 1     Goal/Measure of Progress Goal Met? 1. Pt will display >120 knee flexion AROM for improved ease of dressing ADL's. Status at last Eval: 0 to 107 Current Status: 0 to 110  progressing   2. Pt will display >4/5 strength in R hip for ease of stair navigation   Status at last Eval: Flex 4/5, ext 4-/5, abd 3+/5 Current Status: Flexion 4/5, ext 4/5, abd 4-/5  Progressing    3. Pt will display 30 degrees of cervical extension for washing hair in shower   Status at last Eval: painful Current Status: 36 retraction extension  yes   4.   Pt will maintain upright head posture during session with only Min VC's for correction   Status at last Eval: unable Current Status: Requires 2 vC Progressing      Key Functional Changes/Progress:  FOTO : to be performed NV due to time constraints  Subjective functional improvement: 85% (knee); 75% (neck)   Functional improvements: walking 1 mile: range of motion, reduced swelling, standing tolerance, gait pattern, strength  Functional deficits: trusting herself walking outside, getting comfortable at night  Pain: knees 0-5/10 (weather related stiffness); 0/10 on avg  (R) knee AROM flexion: 0-110 deg  Strength: R Hip flex 4- /5,  4/5 ext,  4-/5 abd                R Knee flex  4+/5,  5/5 ext   SLR (-) quad lag   Gait: mild antalgia, reduced knee flexion during swing, reduced R heel strike   Stairs: reciprocal pattern with light use of B rails  Squat: functional patterning  Sit to stand: min use of UE's, WNL patterning and no pain      Cervical assessment:    Chief complaint: unable to hold head up in neutral position for extended periods; L shoulder pain and weakness  Functional deficits: sitting erect; C/S extension, L shoulder pain and weakness   SERGIO: Admits to h/o low potassium which began head drop issues about 1-1.5 years ago   No radicular symptoms present  Pain ranges: 0 to 5/10 (worst with sitting upright, better with poor posture)  Cervical ROM: Flexion 45,  Ext 22    R SB 20,   L SB 25   L rot 50, R rot 55  C/S retractioN: retracts to 5 from neutral    Poor extensor strength with frequent neck flexion  (-) UNNT  (-) spurlings   +ttp B UT (mild), (-) ttp c/s paraspinals, SOR, scalenes     Problem List: pain affecting function, decrease ROM, decrease strength, impaired gait/ balance, decrease ADL/ functional abilitiies, decrease activity tolerance, decrease flexibility/ joint mobility and decrease transfer abilities   Treatment Plan may include any combination of the following: Therapeutic exercise, Therapeutic activities, Neuromuscular re-education, Physical agent/modality, Gait/balance training, Manual therapy, Patient education, Self Care training, Functional mobility training, Home safety training and Stair training  Patient Goal(s) has been updated and includes:     Goals for this certification period include and are to be achieved in   4  treatments:  1. Pt will display >120 knee flexion AROM for improved ease of dressing ADL's.   2 Pt will display >4/5 strength in R hip for ease of stair navigation  3.  Pt will maintain upright head posture during session with only Min VC's for correction  Frequency / Duration:   Patient to be seen   1   times per week for   4    treatments:  G-Codes (GP): jarrod  Assessments/Recommendations: Pt to be seen ideally 1x/week for 4 weeks however she is limited by financial constraints with transportation and [facility] [place] and may only be seen every 3-4 weeks. ADDENDUM ON 3/23/2020: Pt has not returned since 2/17/2020 and will be D/C at this time. If you have any questions/comments please contact us directly at [phone]. Thank you for allowing us to assist in the care of your patient. Therapist Signature: Mickie Ladd DPT Date: 2/26/1517   Certification Period:  Reporting Period: 2/17/2020 to 5/15/2020  12/12/19 to 2/12/2020 Time: 8:22 AM   NOTE TO PHYSICIAN:  PLEASE COMPLETE THE ORDERS BELOW AND FAX TO   [facility]: [phone]  If you are unable to process this request in 24 hours please contact our office: [phone]    ___ I have read the above report and request that my patient continue as recommended.   ___ I have read the above report and request that my patient continue therapy with the following changes/special instructions: ________________________________________________   ___ I have read the above report and request that my patient be discharged from therapy.      Physician Signature:        Date:       Time:

## 2022-03-19 PROBLEM — M17.12 OSTEOARTHRITIS OF LEFT KNEE: Status: ACTIVE | Noted: 2018-04-23

## 2022-03-19 PROBLEM — M17.11 OSTEOARTHRITIS OF RIGHT KNEE: Status: ACTIVE | Noted: 2018-04-21

## 2022-12-08 NOTE — PROGRESS NOTES
[address] [facility] AT [address] [facility], [place] [address]  Phone: [phone] Fax: [phone]  PROGRESS NOTE  Patient Name: Samir Davila : 1940   Treatment/Medical Diagnosis: Pain in right knee [M25.561]  Other acute postprocedural pain [G89.18]  Pain, neck [M54.2]   Referral Source: Porsche Mckeon MD     Date of Initial Visit: 19 Attended Visits: 7 Missed Visits: 0     SUMMARY OF TREATMENT  Pt is a 79 y/o female who presents s/p R TKA revision. Treatment has included: Therapeutic exercise, Therapeutic activities, Neuromuscular re-education, Physical agent/modality (vasopneumatic device), Gait/balance training, Patient education, Self Care training, Functional mobility training, Home safety training and Stair training. CURRENT STATUS  Pt has made great progress thus far with improved AROM, R LE strength, and improved gait mechanics. Pt is using a SPC for ambulation on uneven surfaces or long distances. She remains with gait impairments related to reduced knee flexion ROM, though is improved from IE. Pt is pleased progress made in therapy thus far, though is increasingly concerned about her neck. She reports onset of increased cervical flexion and having difficulty holding her head up. She frequently has to hold her head up using her arms for support. States this has progressively become worse over the past year after an episode of low potassium. No radicular symptoms present at this time. No imaging reports are available at this time. She will benefit from skilled Physical Therapy to address current cervical impairments as well as R knee. May need additional imaging of c/s.      FOTO : 75/100  Subjective functional improvement: 75%  Functional improvements: range of motion, reduced swelling, standing tolerance, gait pattern, strength  Functional deficits: walking long distance, getting in/out of chairs without arm rests   Pain: 2/10 on Pt to bedside, vitals stable and blood sugar taken. Pt is with speech doing scope evaluation.   avg   (R) knee AROM flexion: 0-110 deg  Strength: R Hip flex 4- /5,  3+/5 ext,  4-/5 abd                R Knee flex  4+/5,  5/5 ext   SLR (-) quad lag   Gait: mild antalgia, reduced knee flexion during swing, reduced R heel strike   Stairs: reciprocal pattern with light use of B rails     Cervical assessment:   Chief complaint: unable to hold head up in neutral position for extended periods   SERGIO: Admits to h/o lot potassium which began head drop issues about 1-1.5 years ago   No radicular symptoms present  Cervical ROM: Flexion 35,  Ext 16            R SB 12,   L SB 20        L rot 50, R rot 45   Poor extensor strength with frequent neck flexion  (-) UNNT  (-) spurlings   +ttp B UT (mild), (-) ttp c/s paraspinals, SOR, scalenes     · Short Term Goals: To be accomplished in  1  weeks:  1) Pt will be independent and compliant with HEP -Goal met; pt reports compliance with HEP 12/18/19  · Long Term Goals: To be accomplished in  4-6  weeks:  1) Pt will score >54/100 on FOTO to show significant improvement in functional mobility and QOL -Met; 75/100 on FOTO 1/8/20  2) Pt will display >120 knee flexion AROM for improved ease of dressing ADL's. -Goal progressing; pt demos 110 deg knee flexion AROM  (1/8/20)  3) Pt will display >4/5 strength in R hip for ease of stair navigation -Goal progressing; Hip flex 4- /5,  3+/5 ext,  4-/5 abd (1/8/20)  4) Pt will display full TKE on R LE for improved gait mechanics. -Goal met; 1/8/20      New Goals to be achieved in __4-6__  weeks:  1. Pt will display >120 knee flexion AROM for improved ease of dressing ADL's.   2.  Pt will display >4/5 strength in R hip for ease of stair navigation   3. Pt will display 30 degrees of cervical extension for washing hair in shower   4. Pt will maintain upright head posture during session with only Min VC's for correction     RECOMMENDATIONS  Cont 2x per week for an additional 4-6 weeks.  Please note that patient will not be able to return to Physical Therapy until 2/3/2020 due to financial concerns related to co-pay. If you have any questions/comments please contact us directly at [phone]   Thank you for allowing us to assist in the care of your patient. Therapist Signature: Velma Cazares PT Date: 1/8/2020   Reporting Period  12/12/19 to 1/8/20 Time: 1:11 PM   NOTE TO PHYSICIAN:  PLEASE COMPLETE THE ORDERS BELOW AND FAX TO   [facility]: [phone]. If you are unable to process this request in 24 hours please contact our office: [phone].  ___ I have read the above report and request that my patient continue as recommended.   ___ I have read the above report and request that my patient continue therapy with the following changes/special instructions:_________________________________________________________   ___ I have read the above report and request that my patient be discharged from therapy.      Physician Signature:        Date:       Time:

## 2024-06-05 ENCOUNTER — HOSPITAL ENCOUNTER (OUTPATIENT)
Facility: HOSPITAL | Age: 84
Setting detail: RECURRING SERIES
Discharge: HOME | End: 2024-06-08
Payer: MEDICARE

## 2024-06-05 PROCEDURE — 97535 SELF CARE MNGMENT TRAINING: CPT

## 2024-06-05 PROCEDURE — 97162 PT EVAL MOD COMPLEX 30 MIN: CPT

## 2024-06-05 NOTE — PROGRESS NOTES
studio activities    OBJECTIVE    24 min    [x]Eval (un-timed code)                   Therapeutic Procedures:  Tx Min Billable or 1:1 Min (if diff from Tx Min) Procedure, Rationale, Specifics   8 8 [id number] Self Care/Home Management (timed):  improve patient knowledge and understanding of posture/ergonomics, diagnosis/prognosis, physical therapy expectations, procedures and progression, and HEP  to improve patient's ability to progress to PLOF and address remaining functional goals.  (see flow sheet as applicable)     Details if applicable:  HEP provided     8 8 [facility] Totals Reminder: bill using total billable min of TIMED therapeutic procedures (example: do not include dry needle or estim unattended, both untimed codes, in totals to left)  8-22 min = 1 unit; 23-37 min = 2 units; 38-52 min = 3 units; 53-67 min = 4 units; 68-82 min = 5 units   Total Total     [x]  Patient Education billed concurrently with other procedures     Functional Measures/Assessment:  Cervical AROM:  Flexion 38 Degrees   Extension 42 Degrees   Right Lateral Flexion 6 Degrees   Left Lateral Flexion 25 Degrees   Right Rotation 45 Degrees   Left Rotation 70 Degrees     Strength:  Manual Muscle Testing: Right Left   Upper Trap 5/5 5/5   Shoulder Flexors 4+/5 4+/5   Shoulder Abductors 4+/5 4+/5   Elbow Flexors 4+/5 4+/5   Elbow Extensors 4+/5 4+/5   Shoulder External Rotators 4/5 4/5   Shoulder Internal Rotators 4+/5 4+/5   Wrist Flexors 4+/5 4+/5   Wrist Extensors 4+/5 4+/5   Middle Trapezius NT NT   Lower Trapezius NT NT    (pounds) NT NT     Forward Head: 32 degrees    Posture: increased thoracic kyphosis, protracted head and shoulders    Palpation:significant limitation in end range cervical PROM all planes, increased tone bilateral UA and cervical paraspinals    Special Test:  Cervical Compression -   Cervical Traction -   Spurlings  Right: - Left: -     Repeated Spinal Movement Testing: not assessed; patient denies bilateral UE 
will increase right rotation to 55 degrees to improve turning to right while walking to scan environment and prevent falls.  Status at last assessment: 45 degrees  Patient will report pain 3/10 at worst to improve tolerance to maintaining upright posture during art projects and studio activities.  Status at last assessment: 5/10    Long Term Goals: To be accomplished in 8-12 weeks from initial evaluation  1. Patient will improve forward head to 20 degrees to improve upright posture during meals.  Status at last assessment: 32 degrees  2. Patient will increase right rotation to 65 degrees to improve turning to right while walking to scan environment and prevent falls.  Status at last assessment: 45 degrees  Patient will report pain 3/10 at worst to improve tolerance to maintaining upright posture during art projects and studio activities.  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  Status at last assessment: 22%    Frequency / Duration:Patient would benefit from skilled PT 2 times per week for 24 VISITS sessions as needed in this certification period.  Goals will be assigned and reassessed every 10 visits/ 30 days per Medicare guidelines  Patient/ Caregiver education and instruction: Diagnosis, prognosis, exercises [x]  Plan of care has been reviewed with PTA  Certification Period: 6/5/-24-9/4/24      Connie Luis, PT       6/5/2024       7:07 PM  ===================================================================  I certify that the above Therapy Services are being furnished while the patient is under my care. I agree with the treatment plan and certify that this therapy is necessary.    Physician's Signature:_________________________   DATE:_________   TIME:________                           Keren Barron PA    ** Signature, Date and

## 2024-06-13 ENCOUNTER — HOSPITAL ENCOUNTER (OUTPATIENT)
Facility: HOSPITAL | Age: 84
Setting detail: RECURRING SERIES
Discharge: HOME | End: 2024-06-16
Payer: MEDICARE

## 2024-06-13 PROCEDURE — 97140 MANUAL THERAPY 1/> REGIONS: CPT

## 2024-06-13 PROCEDURE — 97112 NEUROMUSCULAR REEDUCATION: CPT

## 2024-06-13 PROCEDURE — 97110 THERAPEUTIC EXERCISES: CPT

## 2024-06-13 NOTE — PROGRESS NOTES
PHYSICAL / OCCUPATIONAL THERAPY - DAILY TREATMENT NOTE    Patient Name: STACIA Jacobs    Date: 2024    : 1940  Insurance: Payor: [place] [employer] / Plan: [employer] [facility] MEDICARE / Product Type: *No Product type* /      Patient  verified Yes     Visit #   Current / Total 2 10   Time   In / Out 11:01 11:51   Pain   In / Out 0 0   Subjective Functional Status/Changes: \"Did my homework. I have some questions\"  Denies CS pain today      TREATMENT AREA =  Neck pain [M54.2]  Other low back pain [M54.59]     OBJECTIVE    Modalities Rationale:     decrease pain and increase tissue extensibility to improve patient's ability to progress to PLOF and address remaining functional goals.     min [] Estim Unattended, type/location:                                      []  w/ice    []  w/heat    min [] Estim Attended, type/location:                                     []  w/US     []  w/ice    []  w/heat    []  TENS insruct      min []  Mechanical Traction: type/lbs                   []  pro   []  sup   []  int   []  cont    []  before manual    []  after manual    min []  Ultrasound, settings/location:     10 min  unbill []  Ice     [x]  Heat    location/position: Supine with 2 pillows and LE on wedge after MT; to CS and UT    min []  Paraffin,  details:     min []  Vasopneumatic Device, press/temp:     min []  Whirlpool / Fluido:    If using vaso (only need to measure limb vaso being performed on)      pre-treatment girth :       post-treatment girth :       measured at (landmark location) :      min []  Other:    Skin assessment post-treatment:   Intact      Therapeutic Procedures:    Tx Min Billable or 1:1 Min (if diff from Tx Min) Procedure, Rationale, Specifics   18  [id number] Therapeutic Exercise (timed):  increase ROM, strength, coordination, balance, and proprioception to improve patient's ability to progress to PLOF and address remaining functional goals. (see flow sheet as applicable)     Details

## 2024-06-18 ENCOUNTER — HOSPITAL ENCOUNTER (OUTPATIENT)
Facility: HOSPITAL | Age: 84
Setting detail: RECURRING SERIES
Discharge: HOME | End: 2024-06-21
Payer: MEDICARE

## 2024-06-18 PROCEDURE — 97110 THERAPEUTIC EXERCISES: CPT

## 2024-06-18 PROCEDURE — 97140 MANUAL THERAPY 1/> REGIONS: CPT

## 2024-06-18 PROCEDURE — 97112 NEUROMUSCULAR REEDUCATION: CPT

## 2024-06-18 NOTE — PROGRESS NOTES
PHYSICAL / OCCUPATIONAL THERAPY - DAILY TREATMENT NOTE (updated )    Patient Name: STACIA Jacobs    Date: 2024    : 1940  Insurance: Payor: [employer] [employer] / Plan: [employer] [facility] MEDICARE / Product Type: *No Product type* /      Patient  verified yes     Visit #   Current / Total 3 10 Total Time   Time   In / Out 3:01 3:57 56   Pain   In / Out 0 0    Subjective Functional Status/Changes: HEP going well. Did doorway stretch a couple times since last appointment. This morning did a lot of gardening and feels like posture is affected by that.      TREATMENT AREA =  Neck pain [M54.2]  Other low back pain [M54.59]    OBJECTIVE    Modalities Rationale:     decrease pain and improve tissue extensibility to improve patient's ability to progress to PLOF and address remaining functional goals.             -  10 min   []  Ice     [x]  Heat    location/position: Supine with LE on wedge after MT; to CS and UT     Skin assessment post-treatment (if applicable):    [x]  intact    []  redness- no adverse reaction                 []redness - adverse reaction:          Therapeutic Procedures:  46  Total 40  Total  BC Totals Reminder: bill using total billable min of TIMED therapeutic procedures (example: do not include dry needle or estim unattended, both untimed codes, in totals to left)  8-22 min = 1 unit; 23-37 min = 2 units; 38-52 min = 3 units; 53-67 min = 4 units; 68-82 min = 5 units   Tx Min Billable or 1:1 Min (if diff from Tx Min) Procedure, Rationale, Specifics   20 14 [id number] Therapeutic Exercise (timed):  increase ROM, strength, coordination, balance, and proprioception to improve patient's ability to progress to PLOF and address remaining functional goals. (see flow sheet as applicable)   Details if applicable:  CS and TS ROM, GHJ ROM/ pec flexibility     -  39387 Therapeutic Activity (timed):  use of dynamic activities replicating functional movements to increase ROM, strength,

## 2024-06-20 ENCOUNTER — HOSPITAL ENCOUNTER (OUTPATIENT)
Facility: HOSPITAL | Age: 84
Setting detail: RECURRING SERIES
Discharge: HOME | End: 2024-06-23
Payer: MEDICARE

## 2024-06-20 PROCEDURE — 97530 THERAPEUTIC ACTIVITIES: CPT

## 2024-06-20 PROCEDURE — 97140 MANUAL THERAPY 1/> REGIONS: CPT

## 2024-06-20 PROCEDURE — 97110 THERAPEUTIC EXERCISES: CPT

## 2024-06-20 NOTE — PROGRESS NOTES
PHYSICAL / OCCUPATIONAL THERAPY - DAILY TREATMENT NOTE (updated )    Patient Name: STACIA Jacobs    Date: 2024    : 1940  Insurance: Payor: [employer] [employer] / Plan: [employer] [facility] MEDICARE / Product Type: *No Product type* /      Patient  verified yes     Visit #   Current / Total 4 10 Total Time   Time   In / Out 3:00 PM 3:59 PM 59   Pain   In / Out 3/10 010    Subjective Functional Status/Changes: \"I am doing okay today, this is helpful for me.\"      TREATMENT AREA =  Neck pain [M54.2]  Other low back pain [M54.59]    OBJECTIVE    Modalities Rationale:     decrease pain and improve tissue extensibility to improve patient's ability to progress to PLOF and address remaining functional goals.             -  5 min   []  Ice     [x]  Heat    location/position: Supine with LE on wedge after MT; to CS and UT     Skin assessment post-treatment (if applicable):    [x]  intact    []  redness- no adverse reaction                 []redness - adverse reaction:          Therapeutic Procedures:    Total  54   Total  53 [facility] Totals Reminder: bill using total billable min of TIMED therapeutic procedures (example: do not include dry needle or estim unattended, both untimed codes, in totals to left)  8-22 min = 1 unit; 23-37 min = 2 units; 38-52 min = 3 units; 53-67 min = 4 units; 68-82 min = 5 units   Tx Min Billable or 1:1 Min (if diff from Tx Min) Procedure, Rationale, Specifics   18 17 24943 Therapeutic Exercise (timed):  increase ROM, strength, coordination, balance, and proprioception to improve patient's ability to progress to PLOF and address remaining functional goals. (see flow sheet as applicable)     Details if applicable:  mobility, stretches, strengthening      13 13 40917 Therapeutic Activity (timed):  use of dynamic activities replicating functional movements to increase ROM, strength, coordination, balance, and proprioception in order to improve patient's ability to progress to

## 2024-06-25 ENCOUNTER — HOSPITAL ENCOUNTER (OUTPATIENT)
Facility: HOSPITAL | Age: 84
Setting detail: RECURRING SERIES
Discharge: HOME | End: 2024-06-28
Payer: MEDICARE

## 2024-06-25 PROCEDURE — 97140 MANUAL THERAPY 1/> REGIONS: CPT

## 2024-06-25 PROCEDURE — 97530 THERAPEUTIC ACTIVITIES: CPT

## 2024-06-25 PROCEDURE — 97110 THERAPEUTIC EXERCISES: CPT

## 2024-06-25 NOTE — PROGRESS NOTES
PHYSICAL / OCCUPATIONAL THERAPY - DAILY TREATMENT NOTE (updated )    Patient Name: STACIA Jacobs    Date: 2024    : 1940  Insurance: Payor: [employer]IC[employer] / Plan: [place] [facility] MEDICARE / Product Type: *No Product type* /      Patient  verified yes     Visit #   Current / Total 5 10 Total Time   Time   In / Out 2:13 PM 3:16 PM  63   Pain   In / Out 2/10 0/10    Subjective Functional Status/Changes: \"I have been working on keeping my head up when I am eating instead of holding it up. I am not a big fan of the doorway stretch.\"     TREATMENT AREA =  Neck pain [M54.2]  Other low back pain [M54.59]    OBJECTIVE    Modalities Rationale:     decrease pain and improve tissue extensibility to improve patient's ability to progress to PLOF and address remaining functional goals.             -  10 min   []  Ice     [x]  Heat    location/position: Supine with LE on wedge after MT; to CS and UT     Skin assessment post-treatment (if applicable):    [x]  intact    []  redness- no adverse reaction                 []redness - adverse reaction:          Therapeutic Procedures:    Total  53   Total  53 [facility] Totals Reminder: bill using total billable min of TIMED therapeutic procedures (example: do not include dry needle or estim unattended, both untimed codes, in totals to left)  8-22 min = 1 unit; 23-37 min = 2 units; 38-52 min = 3 units; 53-67 min = 4 units; 68-82 min = 5 units   Tx Min Billable or 1:1 Min (if diff from Tx Min) Procedure, Rationale, Specifics    35706 Therapeutic Exercise (timed):  increase ROM, strength, coordination, balance, and proprioception to improve patient's ability to progress to PLOF and address remaining functional goals. (see flow sheet as applicable)     Details if applicable: UBE, stretches, strengthening       31803 Therapeutic Activity (timed):  use of dynamic activities replicating functional movements to increase ROM, strength, coordination,

## 2024-06-27 ENCOUNTER — HOSPITAL ENCOUNTER (OUTPATIENT)
Facility: HOSPITAL | Age: 84
Setting detail: RECURRING SERIES
Discharge: HOME | End: 2024-06-30
Payer: MEDICARE

## 2024-06-27 PROCEDURE — 97530 THERAPEUTIC ACTIVITIES: CPT

## 2024-06-27 PROCEDURE — 97140 MANUAL THERAPY 1/> REGIONS: CPT

## 2024-06-27 PROCEDURE — 97110 THERAPEUTIC EXERCISES: CPT

## 2024-06-27 NOTE — PROGRESS NOTES
PHYSICAL / OCCUPATIONAL THERAPY - DAILY TREATMENT NOTE (updated )    Patient Name: STACIA Jacobs    Date: 2024    : 1940  Insurance: Payor: [place] [employer] / Plan: [employer] [facility] MEDICARE / Product Type: *No Product type* /      Patient  verified yes     Visit #   Current / Total 6 10 Total Time   Time   In / Out 1:00 pm 1:50 pm  50   Pain   In / Out 2/10 1/10    Subjective Functional Status/Changes: \"The left side is a curse. Look how far I can lift up my right arm.\"     TREATMENT AREA =  Neck pain [M54.2]  Other low back pain [M54.59]    OBJECTIVE    Modalities Rationale:     decrease pain and improve tissue extensibility to improve patient's ability to progress to PLOF and address remaining functional goals.             -  10 min   []  Ice     [x]  Heat    location/position: Supine with LE on wedge after MT; to CS and UT     Skin assessment post-treatment (if applicable):    [x]  intact    []  redness- no adverse reaction                 []redness - adverse reaction:          Therapeutic Procedures:    Total  40   Total  40 [facility] Totals Reminder: bill using total billable min of TIMED therapeutic procedures (example: do not include dry needle or estim unattended, both untimed codes, in totals to left)  8-22 min = 1 unit; 23-37 min = 2 units; 38-52 min = 3 units; 53-67 min = 4 units; 68-82 min = 5 units   Tx Min Billable or 1:1 Min (if diff from Tx Min) Procedure, Rationale, Specifics   12 12 39450 Therapeutic Exercise (timed):  increase ROM, strength, coordination, balance, and proprioception to improve patient's ability to progress to PLOF and address remaining functional goals. (see flow sheet as applicable)     Details if applicable: UBE, stretches, strengthening      16 16 29584 Therapeutic Activity (timed):  use of dynamic activities replicating functional movements to increase ROM, strength, coordination, balance, and proprioception in order to improve patient's ability to

## 2024-07-01 ENCOUNTER — HOSPITAL ENCOUNTER (OUTPATIENT)
Facility: HOSPITAL | Age: 84
Setting detail: RECURRING SERIES
Discharge: HOME | End: 2024-07-04
Payer: MEDICARE

## 2024-07-01 PROCEDURE — 97140 MANUAL THERAPY 1/> REGIONS: CPT

## 2024-07-01 PROCEDURE — 97110 THERAPEUTIC EXERCISES: CPT

## 2024-07-01 PROCEDURE — 97530 THERAPEUTIC ACTIVITIES: CPT

## 2024-07-01 NOTE — PROGRESS NOTES
PHYSICAL / OCCUPATIONAL THERAPY - DAILY TREATMENT NOTE (updated )    Patient Name: STACIA Jacobs    Date: 2024    : 1940  Insurance: Pay[place]: [place] / Plan: [place] [facility] MEDICARE / Product Type: *No Product type* /      Patient  verified yes     Visit #   Current / Total 7 10 Total Time   Time   In / Out 5:00 pm 5:52 pm 52   Pain   In / Out 0/10 0/10    Subjective Functional Status/Changes: \"I am just doing me.\"     TREATMENT AREA =  Neck pain [M54.2]  Other low back pain [M54.59]    OBJECTIVE    Modalities Rationale:     decrease pain and improve tissue extensibility to improve patient's ability to progress to PLOF and address remaining functional goals.             -  10 min   []  Ice     [x]  Heat    location/position: Supine with LE on wedge after MT; to CS and UT     Skin assessment post-treatment (if applicable):    [x]  intact    []  redness- no adverse reaction                 []redness - adverse reaction:          Therapeutic Procedures:    Total  42   Total  38 [facility] Totals Reminder: bill using total billable min of TIMED therapeutic procedures (example: do not include dry needle or estim unattended, both untimed codes, in totals to left)  8-22 min = 1 unit; 23-37 min = 2 units; 38-52 min = 3 units; 53-67 min = 4 units; 68-82 min = 5 units   Tx Min Billable or 1:1 Min (if diff from Tx Min) Procedure, Rationale, Specifics   12 12 16201 Therapeutic Exercise (timed):  increase ROM, strength, coordination, balance, and proprioception to improve patient's ability to progress to PLOF and address remaining functional goals. (see flow sheet as applicable)     Details if applicable: UBE, stretches, strengthening      16 16 84484 Therapeutic Activity (timed):  use of dynamic activities replicating functional movements to increase ROM, strength, coordination, balance, and proprioception in order to improve patient's ability to progress to PLOF and address remaining

## 2024-07-03 ENCOUNTER — HOSPITAL ENCOUNTER (OUTPATIENT)
Facility: HOSPITAL | Age: 84
Setting detail: RECURRING SERIES
Discharge: HOME | End: 2024-07-06
Payer: MEDICARE

## 2024-07-03 PROCEDURE — 97140 MANUAL THERAPY 1/> REGIONS: CPT

## 2024-07-03 PROCEDURE — 97110 THERAPEUTIC EXERCISES: CPT

## 2024-07-03 NOTE — PROGRESS NOTES
PHYSICAL / OCCUPATIONAL THERAPY - DAILY TREATMENT NOTE (updated )    Patient Name: STACIA Jacobs    Date: 7/3/2024    : 1940  Insurance: Payor: [employer] / Plan: [employer] [facility] MEDICARE / Product Type: *No Product type* /      Patient  verified yes     Visit #   Current / Total 8 10 Total Time   Time   In / Out 5:40 pm 6:32 pm 52   Pain   In / Out 0/10 0/10    Subjective Functional Status/Changes: \"I really feel like this has been helping. I can hold my head up longer when I am at the dinner table.\"     TREATMENT AREA =  Neck pain [M54.2]  Other low back pain [M54.59]    OBJECTIVE    Modalities Rationale:     decrease pain and improve tissue extensibility to improve patient's ability to progress to PLOF and address remaining functional goals.             -  10 min   []  Ice     [x]  Heat    location/position: Supine with LE on wedge after MT; to CS and UT     Skin assessment post-treatment (if applicable):    [x]  intact    []  redness- no adverse reaction                 []redness - adverse reaction:          Therapeutic Procedures:    Total  42   Total  38 [facility] Totals Reminder: bill using total billable min of TIMED therapeutic procedures (example: do not include dry needle or estim unattended, both untimed codes, in totals to left)  8-22 min = 1 unit; 23-37 min = 2 units; 38-52 min = 3 units; 53-67 min = 4 units; 68-82 min = 5 units   Tx Min Billable or 1:1 Min (if diff from Tx Min) Procedure, Rationale, Specifics   [phone] Therapeutic Exercise (timed):  increase ROM, strength, coordination, balance, and proprioception to improve patient's ability to progress to PLOF and address remaining functional goals. (see flow sheet as applicable)     Details if applicable: reassessment     [phone] Manual Therapy (timed):  decrease pain, increase ROM, increase tissue extensibility, decrease trigger points, and increase postural awareness to improve patient's ability to progress to 
  Middle Trapezius NT NT   Lower Trapezius NT NT    (pounds) NT NT      Forward Head: 27 degrees     Posture: increased thoracic kyphosis, protracted head and shoulders     NDI Score: 2.22%     Progress toward goals / Updated goals:    Short Term Goals: To be accomplished in 4 weeks from initial evaluation  Patient will report good compliance with daily HEP to improve tolerance to maintaining upright posture during ADLs.  Status at last assessment: provided HEP at IE  Current: goal met, reports consistent compliance   Patient will improve forward head to 26 degrees to improve upright posture during meals.  Status at last assessment: 32 degrees  Current: goal near met; 27 deg  Patient will increase right rotation to 55 degrees to improve turning to right while walking to scan environment and prevent falls.  Status at last assessment: 45 degrees   Current: goal met; 55 degrees  Patient will report pain 3/10 at worst to improve tolerance to maintaining upright posture during art projects and studio activities.  Status at last assessment: 5/10  Current: goal met, 3/10 at worst      Long Term Goals: To be accomplished in 8-12 weeks from initial evaluation  1. Patient will improve forward head to 20 degrees to improve upright posture during meals.  Status at last assessment: 32 degrees  Current: goal progressing; 27 degrees  2. Patient will increase right rotation to 65 degrees to improve turning to right while walking to scan environment and prevent falls.  Status at last assessment: 45 degrees  Current: goal progressing; 55 degrees  - Patient will report pain 3/10 at worst to improve tolerance to maintaining upright posture during art projects and studio activities.  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

## 2024-07-08 ENCOUNTER — HOSPITAL ENCOUNTER (OUTPATIENT)
Facility: HOSPITAL | Age: 84
Setting detail: RECURRING SERIES
Discharge: HOME | End: 2024-07-11
Payer: MEDICARE

## 2024-07-08 PROCEDURE — 97140 MANUAL THERAPY 1/> REGIONS: CPT

## 2024-07-08 PROCEDURE — 97530 THERAPEUTIC ACTIVITIES: CPT

## 2024-07-08 PROCEDURE — 97110 THERAPEUTIC EXERCISES: CPT

## 2024-07-08 NOTE — PROGRESS NOTES
Physical Therapy Discharge Instructions      [facility]   [address] [address] [phone] fax      Patient: STACIA Jacobs  : 1940      Continue Home Exercise Program 1 times per day:      Continue with    [x] Heat           Follow up with MD:     [x] As needed      Recommendations:     [x]   Return to activity with home program        Additional Comments: Eda, thank you for your hard work during your therapy sessions and congratulations on your progress! It has been a pleasure working with you. Please reach out in the future if you have any questions.          Js Reyes, PTA 2024 6:20 PM    
degrees  2. Patient will increase right rotation to 65 degrees to improve turning to right while walking to scan environment and prevent falls.  Status at last assessment: 55 degrees  Current: goal not met; remains 55 degrees  - Patient will report pain 3/10 at worst to improve tolerance to maintaining upright posture during art projects and studio activities.  Status at last assessment: 3/10 worst pain  Current: goal not met; remains 3/10 worst pain       Next PN/ RC due 7/3/24; Recert due 9/4/24  Auth due (visit number/ date) as medically necessary     PLAN  [x]  Discharge due to : program complete    Js Reyes, PTA    7/8/2024    6:31 PM    No future appointments.

## 2025-04-09 ENCOUNTER — TRANSCRIBE ORDERS (OUTPATIENT)
Facility: HOSPITAL | Age: 85
End: 2025-04-09

## 2025-04-09 DIAGNOSIS — Z12.31 VISIT FOR SCREENING MAMMOGRAM: Primary | ICD-10-CM

## 2025-04-18 ENCOUNTER — HOSPITAL ENCOUNTER (OUTPATIENT)
Dept: WOMENS IMAGING | Facility: HOSPITAL | Age: 85
Discharge: HOME | End: 2025-04-21
Payer: MEDICARE

## 2025-04-18 VITALS — HEIGHT: 64 IN | BODY MASS INDEX: 30.73 KG/M2 | WEIGHT: 180 LBS

## 2025-04-18 DIAGNOSIS — Z12.31 VISIT FOR SCREENING MAMMOGRAM: ICD-10-CM

## 2025-04-18 PROCEDURE — 77063 BREAST TOMOSYNTHESIS BI: CPT

## (undated) DEVICE — SUT VCRL + 1 36IN CT1 VIO --

## (undated) DEVICE — THE CANADY HYBRID PLASMA SCALPEL IS AN ELECTROSURGICAL PLASMA SCALPEL THAT USES AN 85MM BENDABLE PADDLE BLADE TIP. THE ELECTROSURGICAL PLASMA SCALPEL IS USED TO SIMULTANEOUSLY CUT AND COAGULATE BIOLOGICAL TISSUE.: Brand: CANADY HYBRID PLASMA PADDLE BLADE

## (undated) DEVICE — SOL INJ L R 1000ML BG --

## (undated) DEVICE — SYR 50ML LR LCK 1ML GRAD NSAF --

## (undated) DEVICE — DRSG MEPILES BORDER AG 4X12 -- 5/BX

## (undated) DEVICE — SOLUTION IV 100ML 0.9% SOD CHL DIL INJ

## (undated) DEVICE — GOWN,SIRUS,NONRNF,SETINSLV,XL,20/CS: Brand: MEDLINE

## (undated) DEVICE — BLADE SAW W13XL90MM 1.19MM PARA

## (undated) DEVICE — NDL SPNE QNCKE 18GX3.5IN LF --

## (undated) DEVICE — PIN GUIDE FIX 3.2X62 MM SCREW [GS9030620324P] [KOMET MEDICAL]

## (undated) DEVICE — SOL IRRIGATION INJ NACL 0.9% 500ML BTL

## (undated) DEVICE — UNDERCAST PADDING: Brand: DEROYAL

## (undated) DEVICE — SYR 3ML LL TIP 1/10ML GRAD --

## (undated) DEVICE — NDL PRT INJ NSAF BLNT 18GX1.5 --

## (undated) DEVICE — SOLUTION SCRB 4OZ 10% PVP I POVIDONE IOD TOP PAINT EXIDINE

## (undated) DEVICE — Device

## (undated) DEVICE — REM POLYHESIVE ADULT PATIENT RETURN ELECTRODE: Brand: VALLEYLAB

## (undated) DEVICE — KENDALL SCD EXPRESS FOOT CUFF, MEDIUM: Brand: KENDALL SCD

## (undated) DEVICE — PIN GUIDE FIX 3.2X62 MM SCREW [GS903A0620322P] [KOMET MEDICAL]

## (undated) DEVICE — SUTURE STRATAFIX SYMMETRIC PDS + 1 SGL ARMED CT 18 IN LEN SXPP1A405

## (undated) DEVICE — SUT VCRL + 2-0 36IN CT1 UD --

## (undated) DEVICE — PLUS HANDPIECE WITH SPRAY TIP: Brand: SURGILAV

## (undated) DEVICE — CONCISE CEMENT SCULPS KIT: Brand: CONCISE

## (undated) DEVICE — TRAY PHAR SYR 30ML PLAS LUERLOCK TIP N CTRL CONVENIENCE

## (undated) DEVICE — 3 BONE CEMENT MIXER: Brand: MIXEVAC

## (undated) DEVICE — NEEDLE SPNL 20GA L3.5IN YEL HUB S STL REG WALL FIT STYL W/

## (undated) DEVICE — ADHESIVE TISS CLOSURE 22X4 CM 4 CC HI VISC EXOFIN

## (undated) DEVICE — BLADE SAW 1.19X20X90 MM FOR LG BNE

## (undated) DEVICE — SYRINGE MED 20ML STD CLR PLAS LUERLOCK TIP N CTRL DISP